# Patient Record
Sex: FEMALE | Race: BLACK OR AFRICAN AMERICAN | Employment: OTHER | URBAN - METROPOLITAN AREA
[De-identification: names, ages, dates, MRNs, and addresses within clinical notes are randomized per-mention and may not be internally consistent; named-entity substitution may affect disease eponyms.]

---

## 2017-05-16 ENCOUNTER — OFFICE VISIT (OUTPATIENT)
Dept: FAMILY MEDICINE CLINIC | Age: 82
End: 2017-05-16

## 2017-05-16 ENCOUNTER — HOSPITAL ENCOUNTER (OUTPATIENT)
Dept: LAB | Age: 82
Discharge: HOME | End: 2017-05-16
Payer: MEDICARE

## 2017-05-16 VITALS
HEART RATE: 70 BPM | TEMPERATURE: 95.3 F | RESPIRATION RATE: 16 BRPM | HEIGHT: 65 IN | BODY MASS INDEX: 31.79 KG/M2 | DIASTOLIC BLOOD PRESSURE: 82 MMHG | OXYGEN SATURATION: 95 % | SYSTOLIC BLOOD PRESSURE: 130 MMHG | WEIGHT: 190.8 LBS

## 2017-05-16 DIAGNOSIS — Z00.00 MEDICARE ANNUAL WELLNESS VISIT, SUBSEQUENT: Primary | ICD-10-CM

## 2017-05-16 DIAGNOSIS — Z23 ENCOUNTER FOR IMMUNIZATION: ICD-10-CM

## 2017-05-16 DIAGNOSIS — E55.9 HYPOVITAMINOSIS D: ICD-10-CM

## 2017-05-16 DIAGNOSIS — E78.2 MIXED HYPERLIPIDEMIA: ICD-10-CM

## 2017-05-16 DIAGNOSIS — I10 ESSENTIAL HYPERTENSION: ICD-10-CM

## 2017-05-16 DIAGNOSIS — E11.9 TYPE 2 DIABETES MELLITUS WITHOUT COMPLICATION, WITHOUT LONG-TERM CURRENT USE OF INSULIN (HCC): ICD-10-CM

## 2017-05-16 DIAGNOSIS — I25.10 ATHEROSCLEROSIS OF NATIVE CORONARY ARTERY OF NATIVE HEART WITHOUT ANGINA PECTORIS: ICD-10-CM

## 2017-05-16 DIAGNOSIS — Z51.81 ENCOUNTER FOR MEDICATION MONITORING: ICD-10-CM

## 2017-05-16 LAB
BILIRUB UR QL STRIP: NEGATIVE
GLUCOSE POC: 120 MG/DL
GLUCOSE UR-MCNC: NEGATIVE MG/DL
HBA1C MFR BLD HPLC: 6.9 %
KETONES P FAST UR STRIP-MCNC: NEGATIVE MG/DL
PH UR STRIP: 7 [PH] (ref 4.6–8)
PROT UR QL STRIP: NEGATIVE MG/DL
SP GR UR STRIP: 1.01 (ref 1–1.03)
UA UROBILINOGEN AMB POC: NORMAL (ref 0.2–1)
URINALYSIS CLARITY POC: CLEAR
URINALYSIS COLOR POC: YELLOW
URINE BLOOD POC: NEGATIVE
URINE LEUKOCYTES POC: NEGATIVE
URINE NITRITES POC: NEGATIVE

## 2017-05-16 PROCEDURE — 36415 COLL VENOUS BLD VENIPUNCTURE: CPT

## 2017-05-16 PROCEDURE — 80048 BASIC METABOLIC PNL TOTAL CA: CPT

## 2017-05-16 PROCEDURE — 82306 VITAMIN D 25 HYDROXY: CPT

## 2017-05-16 PROCEDURE — 80061 LIPID PANEL: CPT

## 2017-05-16 RX ORDER — AMOXICILLIN 500 MG/1
CAPSULE ORAL
Refills: 0 | COMMUNITY
Start: 2017-04-27 | End: 2017-05-16 | Stop reason: ALTCHOICE

## 2017-05-16 NOTE — PROGRESS NOTES
Chief Complaint   Patient presents with   [facility] Annual Wellness Visit     Medicare     CMP 11/8/2016    A1C 11/8/2016    Lipid 11/8/2016    Microalbumin 11/8/2016    Mammogram 3/18/2015    Pt states she had an eye exam in with Dr. Nilsa Orona about 3 weeks ago. Verbal order received per Dr. Nani Gaines 23 IM for need for immunization-VORB. Pt received Pneumococcal 23 IM in left deltoid without any difficulty.

## 2017-05-16 NOTE — MR AVS SNAPSHOT
Visit Information Date & Time Provider Department Dept. Phone Encounter #  
 5/16/2017  8:15 AM Payton Lopez MD [facility] [phone] [id number] Follow-up Instructions Return in about 5 months (around 10/16/2017). Upcoming Health Maintenance Date Due  
 EYE EXAM RETINAL OR DILATED Q1 10/14/2016 HEMOGLOBIN A1C Q6M 5/8/2017 INFLUENZA AGE 9 TO ADULT 8/1/2017 GLAUCOMA SCREENING Q2Y 10/14/2017 MICROALBUMIN Q1 11/8/2017 LIPID PANEL Q1 11/8/2017 FOOT EXAM Q1 5/16/2018 MEDICARE YEARLY EXAM 5/17/2018 DTaP/Tdap/Td series (2 - Td) 5/16/2027 Allergies as of 5/16/2017  Review Complete On: 5/16/2017 By: Payton Lopez MD  
  
 Severity Noted Reaction Type Reactions Metformin Medium 04/09/2013   Systemic Hives, Swelling Pt taking Metformin- pt states doesn't think she is allergic Atenolol  02/23/2010    Diarrhea Lisinopril  02/23/2010    Swelling Lip swelling Lozol [Indapamide]  02/23/2010    Unknown (comments) Skelaxin [Metaxalone]  02/23/2010    Unknown (comments) Pt does not recall Current Immunizations  Reviewed on 5/16/2017 Name Date Pneumococcal Conjugate (PCV-13) 2/3/2015 Td, Adsorbed PF 2/9/2013  1:52 AM  
  
 Reviewed by Payton Lopez MD on 5/16/2017 at  8:58 AM  
You Were Diagnosed With   
  
 Codes Comments Type 2 diabetes mellitus without complication, without long-term current use of insulin (HCC)    -  Primary ICD-10-CM: E11.9 ICD-9-CM: 250.00 Essential hypertension     ICD-10-CM: I10 
ICD-9-CM: 401.9 Mixed hyperlipidemia     ICD-10-CM: E78.2 ICD-9-CM: 272.2 Atherosclerosis of native coronary artery of native heart without angina pectoris     ICD-10-CM: I25.10 ICD-9-CM: 414.01 Hypovitaminosis D     ICD-10-CM: E55.9 ICD-9-CM: 268.9  Encounter for medication monitoring     ICD-10-CM: Z51.81 
ICD-9-CM: V58.83   
 Medicare annual wellness visit, subsequent     ICD-10-CM: Z00.00 ICD-9-CM: V70.0 Vitals BP Pulse Temp Resp Height(growth percentile) Weight(growth percentile) 130/82 70 95.3 °F (35.2 °C) (Oral) 16 5' 4.5\" (1.638 m) 190 lb 12.8 oz (86.5 kg) SpO2 BMI OB Status Smoking Status 95% 32.24 kg/m2 Postmenopausal Former Smoker Vitals History BMI and BSA Data Body Mass Index Body Surface Area  
 32.24 kg/m 2 1.98 m 2 Preferred Pharmacy Pharmacy Name Phone [facility] [address]  [place] [phone] Your Updated Medication List  
  
   
This list is accurate as of: 5/16/17  9:19 AM.  Always use your most recent med list.  
  
  
  
  
 ALEVE 220 mg Cap Generic drug:  naproxen sodium Take 2 Caps by mouth as needed. allopurinol 300 mg tablet Commonly known as:  ZYLOPRIM  
TAKE 1 TABLET BY MOUTH ONCE DAILY  
  
 aspirin delayed-release 81 mg tablet Take 1 tablet by mouth daily. atorvastatin 40 mg tablet Commonly known as:  LIPITOR  
TAKE 1 TABLET BY MOUTH DAILY  
  
 clopidogrel 75 mg Tab Commonly known as:  PLAVIX TAKE 1 TABLET BY MOUTH EVERY DAY  
  
 glucose blood VI test strips strip Commonly known as:  ASCENSIA AUTODISC VI, ONE TOUCH ULTRA TEST VI  
As directed  
  
 losartan-hydroCHLOROthiazide 100-12.5 mg per tablet Commonly known as:  HYZAAR  
TAKE 1 TABLET BY MOUTH DAILY. REPLACES STANDALONE LOSARTAN ON 1/28/16  
  
 metFORMIN  mg tablet Commonly known as:  GLUCOPHAGE XR  
TAKE 1 TABLET BY MOUTH DAILY WITH DINNER  
  
 metoprolol tartrate 50 mg tablet Commonly known as:  LOPRESSOR  
TAKE 2 TABLETS BY MOUTH TWICE DAILY We Performed the Following AMB POC GLUCOSE, QUANTITATIVE, BLOOD [86826 CPT(R)] AMB POC HEMOGLOBIN A1C [31203 CPT(R)] AMB POC URINALYSIS DIP STICK AUTO W/ MICRO [70151 CPT(R)] LIPID PANEL [80930 CPT(R)] METABOLIC PANEL, BASIC [25389 CPT(R)] VITAMIN D, 25 HYDROXY [id number] CPT(R)] Follow-up Instructions Return in about 5 months (around 10/16/2017). Introducing [facility] & [facility] SERVICES! [employer] introduces [employer] patient portal. Now you can access parts of your medical record, email your doctor's office, and request medication refills online. 1. In your internet browser, go to [email or web address] 2. Click on the First Time User? Click Here link in the Sign In box. You will see the New Member Sign Up page. 3. Enter your [employer] Access Code exactly as it appears below. You will not need to use this code after youve completed the sign-up process. If you do not sign up before the expiration date, you must request a new code. · [employer] Access Code: [email or web address] Expires: 8/14/2017  9:19 AM 
 
4. Enter the last four digits of your Social Security Number (xxxx) and Date of Birth (mm/dd/yyyy) as indicated and click Submit. You will be taken to the next sign-up page. 5. Create a [employer] ID. This will be your [employer] login ID and cannot be changed, so think of one that is secure and easy to remember. 6. Create a [employer] password. You can change your password at any time. 7. Enter your Password Reset Question and Answer. This can be used at a later time if you forget your password. 8. Enter your e-mail address. You will receive e-mail notification when new information is available in [address] 9. Click Sign Up. You can now view and download portions of your medical record. 10. Click the Download Summary menu link to download a portable copy of your medical information. If you have questions, please visit the Frequently Asked Questions section of the [employer] website. Remember, [employer] is NOT to be used for urgent needs. For medical emergencies, dial 911. Now available from your iPhone and Android! Please provide this summary of care documentation to your next provider. Your primary care clinician is listed as Erick Perez. If you have any questions after today's visit, please call [phone].

## 2017-05-16 NOTE — PROGRESS NOTES
This is a Subsequent Medicare Annual Wellness Visit providing Personalized Prevention Plan Services (PPPS) (Performed 12 months after initial AWV and PPPS )    I have reviewed the patient's medical history in detail and updated the computerized patient record. Cardiovascular Review:  The patient has hypertension, hyperlipidemia and coronary artery disease. Diet and Lifestyle: generally follows a low fat low cholesterol diet, generally follows a low sodium diet, follows a diabetic diet regularly, exercises sporadically  Home BP Monitorins/70s. Pertinent ROS: taking medications as instructed, no medication side effects noted, no TIA's, no chest pain on exertion, no dyspnea on exertion, no swelling of ankles. DM type II follow up:  Compliant w/ meds, diabetic diet, and exercise. Obtains home glucose monitoring averaging 100-140. Checks BS sproradically. Pt does not have BS log at visit today. No Rf needed for today. Denies any tingling sensation, polyuria and polydipsia. No blurred vision. No significant weight changes. Feeling well since last OV. History     Past Medical History:   Diagnosis Date    CAD (coronary artery disease) 2010    Cataract     Chest pain, unspecified     Chronic low back pain 2010    DM (diabetes mellitus) ([facility] [address] 2010    Gout 2010    HTN (hypertension) 2010    Hyperlipidemia 2010    Other acute and subacute form of ischemic heart disease     Palpitations     Postsurgical percutaneous transluminal coronary angioplasty status 2012    Tachycardia, unspecified       Past Surgical History:   Procedure Laterality Date    HX CATARACT REMOVAL  2010    HX PTCA      GA COLONOSCOPY FLX DX W/COLLJ SPEC WHEN PFRMD  2005    repeat in 10 yrs Dr. Edson Torres     Current Outpatient Prescriptions   Medication Sig Dispense Refill    losartan-hydroCHLOROthiazide (HYZAAR) 100-12.5 mg per tablet TAKE 1 TABLET BY MOUTH DAILY.  REPLACES STANDALONE LOSARTAN ON 1/28/16 90 Tab 1    clopidogrel (PLAVIX) 75 mg tab TAKE 1 TABLET BY MOUTH EVERY DAY 90 Tab 3    metoprolol tartrate (LOPRESSOR) 50 mg tablet TAKE 2 TABLETS BY MOUTH TWICE DAILY 120 Tab 11    atorvastatin (LIPITOR) 40 mg tablet TAKE 1 TABLET BY MOUTH DAILY 30 Tab 6    allopurinol (ZYLOPRIM) 300 mg tablet TAKE 1 TABLET BY MOUTH ONCE DAILY 90 Tab 3    metFORMIN ER (GLUCOPHAGE XR) 500 mg tablet TAKE 1 TABLET BY MOUTH DAILY WITH DINNER 30 Tab 11    aspirin delayed-release 81 mg tablet Take 1 tablet by mouth daily. 30 tablet 11    naproxen sodium (ALEVE) 220 mg cap Take 2 Caps by mouth as needed.       glucose blood VI test strips (ASCENSIA AUTODISC VI, ONE TOUCH ULTRA TEST VI) strip As directed 1 Package 11     Allergies   Allergen Reactions    Metformin Hives and Swelling     Pt taking Metformin- pt states doesn't think she is allergic    Atenolol Diarrhea    Lisinopril Swelling     Lip swelling    Lozol [Indapamide] Unknown (comments)    Skelaxin [Metaxalone] Unknown (comments)     Pt does not recall     Family History   Problem Relation Age of Onset    Cancer Mother      breast    No Known Problems Sister     No Known Problems Brother     Gout Brother     Gout Brother     No Known Problems Brother     No Known Problems Brother     No Known Problems Brother     No Known Problems Sister      Social History   Substance Use Topics    Smoking status: Former Smoker     Quit date: 1/1/1984    Smokeless tobacco: Never Used    Alcohol use 0.0 oz/week     0 Standard drinks or equivalent per week      Comment: occ     Patient Active Problem List   Diagnosis Code    Chronic low back pain M54.5, G89.29    CAD (coronary artery disease) I25.10    Gout M10.9    LFT'S ABNORMAL     Hypovitaminosis D E55.9    Mitral valve disorders I05.9    Coronary atherosclerosis of native coronary artery I25.10    Obesity, unspecified E66.9    Premature beats, unspecified I49.49    Mixed hyperlipidemia E78.2    Postsurgical percutaneous transluminal coronary angioplasty status Z98.61    Paroxysmal SVT (supraventricular tachycardia) (HCC) I47.1    SVT (supraventricular tachycardia) (HCC) I47.1    Essential hypertension I10    Type 2 diabetes mellitus without complication ([facility] [address] D25.7    Encounter for medication monitoring Z51.81       Depression Risk Factor Screening:     PHQ over the last two weeks 11/8/2016   Little interest or pleasure in doing things Not at all   Feeling down, depressed or hopeless Not at all   Total Score PHQ 2 0     Functional Ability:   Does the patient exhibit a steady gait? yes    How long did it take the patient to get up and walk from a sitting position? seconds    Is the patient self reliant? (ie can do own laundry, meals, household chores)  yes   Does the patient handle his/her own medications? yes   Does the patient handle his/her own money? yes   Is the patients home safe (ie good lighting, handrails on stairs and bath, etc.)? yes   Did you notice or did patient express any hearing difficulties? no   Did you notice or did patient express any vision difficulties?  no   Were distance and reading eye charts used? yes     Advance Care Planning:   Patient was offered the opportunity to discuss advance care planning:  yes    Does patient have an Advance Directive:  Yes, will provide copy for the chart   If no, did you provide information on Caring Connections? Alcohol Risk Factor Screening: On any occasion during the past 3 months, have you had more than 3 drinks containing alcohol? No    Do you average more than 7 drinks per week? No      Functional Ability and Level of Safety:     Hearing Loss   none    Activities of Daily Living   Self-care. Requires assistance with: no ADLs    Fall Risk     Fall Risk Assessment, last 12 mths 5/16/2017   Able to walk? Yes   Fall in past 12 months?  No     Abuse Screen   Patient is not abused    Review of Systems Constitutional: negative for fatigue and malaise  Eyes: negative for irritation and redness  Ears, nose, mouth, throat, and face: negative for earaches, nasal congestion and hoarseness  Respiratory: negative for cough, sputum or dyspnea on exertion  Cardiovascular: negative for chest pain, chest pressure/discomfort, dyspnea, palpitations, irregular heart beats, fatigue, lower extremity edema  Gastrointestinal: negative for dysphagia, dyspepsia, reflux symptoms, nausea, vomiting, change in bowel habits, melena, constipation and abdominal pain  Genitourinary:negative for frequency, dysuria, nocturia, urinary incontinence   Integument/breast: negative for rash and dryness  Hematologic/lymphatic: negative for easy bruising and lymphadenopathy  Musculoskeletal:negative for myalgias, arthralgias, stiff joints, neck pain, back pain and muscle weakness  Neurological: negative for headaches, dizziness, tremor and weakness  Endocrine: negative for diabetic symptoms including polyuria and polydipsia        Physical Examination     Evaluation of Cognitive Function:  Mood/affect:  neutral  Appearance: age appropriate  Family member/caregiver input: NA    Visit Vitals    /82    Pulse 70    Temp 95.3 °F (35.2 °C) (Oral)    Resp 16    Ht 5' 4.5\" (1.638 m)    Wt 190 lb 12.8 oz (86.5 kg)    SpO2 95%    BMI 32.24 kg/m2     General:  Alert, cooperative, no distress, appears stated age. Head:  Normocephalic, without obvious abnormality, atraumatic. Ears:  Normal TMs and external ear canals both ears. Nose: Nares normal. Septum midline. Mucosa normal. No drainage or sinus tenderness. Throat: Lips, mucosa, and tongue normal. Teeth and gums normal.   Neck: Supple, symmetrical, trachea midline, no adenopathy, thyroid: no enlargement/tenderness/nodules, no carotid bruit and no JVD. Back:   Symmetric, no curvature. ROM normal. No CVA tenderness. Lungs:   Clear to auscultation bilaterally.    Chest wall:  No tenderness or deformity. Heart:  Regular rate and rhythm, S1, S2 normal, no murmur, click, rub or gallop. Breast Exam:  No tenderness, masses, or nipple abnormality. Abdomen:   Soft, non-tender. Bowel sounds normal. No masses,  No organomegaly. Rectal:  Normal tone,  no masses or tenderness  Guaiac negative stool. Extremities: Extremities normal, atraumatic, no cyanosis or edema. Pulses: 2+ and symmetric all extremities. Skin: Skin color, texture, turgor normal. No rashes or lesions. Lymph nodes: Cervical, supraclavicular, and axillary nodes normal.   Neurologic: CNII-XII intact. Normal strength, sensation and reflexes throughout. Patient Care Team:  Igor Sevilla MD as PCP - General  Jaleel Wilson RN as Nurse Navigator  Slava Bourgeois MD (Cardiology)    Advice/Referrals/Counseling   Education and counseling provided:  Are appropriate based on today's review and evaluation  End-of-Life planning (with patient's consent)      Assessment/Plan   Jaz Heard was seen today for annual wellness visit. Diagnoses and all orders for this visit:    Medicare annual wellness visit, subsequent  -     AMB POC URINALYSIS DIP STICK AUTO W/ MICRO  -     NM COLLECTION VENOUS BLOOD,VENIPUNCTURE    Essential hypertension  Discussed sodium restriction, high k rich diet, maintaining ideal body weight and regular exercise program such as daily walking 30 min perday 4-5 times per week, as physiologic means to achieve blood pressure control.  Medication compliance advised.      Type 2 diabetes mellitus without complication, without long-term current use of insulin (HCC)  -     AMB POC HEMOGLOBIN A1C  -     AMB POC GLUCOSE, QUANTITATIVE, BLOOD    Mixed hyperlipidemia  -     LIPID PANEL  -     NM COLLECTION VENOUS BLOOD,VENIPUNCTURE    Atherosclerosis of native coronary artery of native heart without angina pectoris  Stable     Hypovitaminosis D  -     VITAMIN D, 25 HYDROXY  -     NM COLLECTION VENOUS BLOOD,VENIPUNCTURE    Encounter for medication monitoring  -     METABOLIC PANEL, BASIC  -     HI COLLECTION VENOUS BLOOD,VENIPUNCTURE      Follow-up Disposition:  Return in about 5 months (around 10/16/2017). reviewed diet, exercise and weight control  cardiovascular risk and specific lipid/LDL goals reviewed  reviewed medications and side effects in detail  specific diabetic recommendations: low cholesterol diet, weight control and daily exercise discussed, home glucose monitoring emphasized, all medications, side effects and compliance discussed carefully, foot care discussed and Podiatry visits discussed, annual eye examinations at Ophthalmology discussed and glycohemoglobin and other lab monitoring discussed  use of aspirin to prevent MI and TIA's discussed. I have discussed diagnosis listed in this note with pt and/or family. I have discussed treatment plans and options and the risk/benefit analysis of those options, including safe use of medications and possible medication side effects. Through the use of shared decision making we have agreed to the above plan. The patient has received an after-visit summary and questions were answered concerning future plans and follow up. Advise pt of any urgent changes then to proceed to the ER.

## 2017-05-17 LAB
25(OH)D3+25(OH)D2 SERPL-MCNC: 20.1 NG/ML (ref 30–100)
BUN SERPL-MCNC: 12 MG/DL (ref 8–27)
BUN/CREAT SERPL: 13 (ref 12–28)
CALCIUM SERPL-MCNC: 9.4 MG/DL (ref 8.7–10.3)
CHLORIDE SERPL-SCNC: 99 MMOL/L (ref 96–106)
CHOLEST SERPL-MCNC: 139 MG/DL (ref 100–199)
CO2 SERPL-SCNC: 24 MMOL/L (ref 18–29)
CREAT SERPL-MCNC: 0.91 MG/DL (ref 0.57–1)
GLUCOSE SERPL-MCNC: 102 MG/DL (ref 65–99)
HDLC SERPL-MCNC: 53 MG/DL
INTERPRETATION, 910389: NORMAL
INTERPRETATION: NORMAL
LDLC SERPL CALC-MCNC: 68 MG/DL (ref 0–99)
PDF IMAGE, 910387: NORMAL
POTASSIUM SERPL-SCNC: 4.6 MMOL/L (ref 3.5–5.2)
SODIUM SERPL-SCNC: 138 MMOL/L (ref 134–144)
TRIGL SERPL-MCNC: 89 MG/DL (ref 0–149)
VLDLC SERPL CALC-MCNC: 18 MG/DL (ref 5–40)

## 2017-05-18 RX ORDER — ERGOCALCIFEROL 1.25 MG/1
50000 CAPSULE ORAL
Qty: 8 CAP | Refills: 0 | Status: SHIPPED | OUTPATIENT
Start: 2017-05-18 | End: 2017-07-07

## 2017-08-28 ENCOUNTER — OFFICE VISIT (OUTPATIENT)
Dept: CARDIOLOGY CLINIC | Age: 82
End: 2017-08-28

## 2017-08-28 VITALS
HEIGHT: 65 IN | HEART RATE: 60 BPM | SYSTOLIC BLOOD PRESSURE: 110 MMHG | WEIGHT: 186.1 LBS | RESPIRATION RATE: 16 BRPM | BODY MASS INDEX: 31 KG/M2 | OXYGEN SATURATION: 97 % | DIASTOLIC BLOOD PRESSURE: 70 MMHG

## 2017-08-28 DIAGNOSIS — E11.9 TYPE 2 DIABETES MELLITUS WITHOUT COMPLICATION, WITHOUT LONG-TERM CURRENT USE OF INSULIN (HCC): ICD-10-CM

## 2017-08-28 DIAGNOSIS — I10 ESSENTIAL HYPERTENSION: ICD-10-CM

## 2017-08-28 DIAGNOSIS — E78.2 MIXED HYPERLIPIDEMIA: ICD-10-CM

## 2017-08-28 DIAGNOSIS — I34.0 MITRAL VALVE INSUFFICIENCY, UNSPECIFIED ETIOLOGY: ICD-10-CM

## 2017-08-28 DIAGNOSIS — I47.1 PAROXYSMAL SVT (SUPRAVENTRICULAR TACHYCARDIA) (HCC): ICD-10-CM

## 2017-08-28 DIAGNOSIS — I25.10 CORONARY ARTERY DISEASE INVOLVING NATIVE CORONARY ARTERY OF NATIVE HEART WITHOUT ANGINA PECTORIS: Primary | ICD-10-CM

## 2017-08-28 DIAGNOSIS — Z98.61 POSTSURGICAL PERCUTANEOUS TRANSLUMINAL CORONARY ANGIOPLASTY STATUS: ICD-10-CM

## 2017-08-28 RX ORDER — ATORVASTATIN CALCIUM 40 MG/1
TABLET, FILM COATED ORAL
Qty: 30 TAB | Refills: 11 | Status: SHIPPED | OUTPATIENT
Start: 2017-08-28 | End: 2017-08-28 | Stop reason: SDUPTHER

## 2017-08-28 RX ORDER — CHOLECALCIFEROL (VITAMIN D3) 125 MCG
CAPSULE ORAL DAILY
COMMUNITY

## 2017-08-28 RX ORDER — ATORVASTATIN CALCIUM 40 MG/1
TABLET, FILM COATED ORAL
Qty: 90 TAB | Refills: 11 | Status: SHIPPED | OUTPATIENT
Start: 2017-08-28

## 2017-08-28 NOTE — MR AVS SNAPSHOT
Visit Information Date & Time Provider Department Dept. Phone Encounter #  
 8/28/2017  1:15 PM Doreen Davis, [address] [facility] [phone] Your Appointments 10/17/2017  8:00 AM  
ROUTINE CARE with Susana Garcia MD  
[facility] [address] [facility]) Appt Note: 5m f/u  
 [address] [facility] [place] [id number] [phone] [address] [facility] [address] Upcoming Health Maintenance Date Due  
 EYE EXAM RETINAL OR DILATED Q1 10/14/2016 INFLUENZA AGE 9 TO ADULT 8/1/2017 MICROALBUMIN Q1 11/8/2017 HEMOGLOBIN A1C Q6M 11/16/2017 FOOT EXAM Q1 5/16/2018 LIPID PANEL Q1 5/16/2018 MEDICARE YEARLY EXAM 5/17/2018 GLAUCOMA SCREENING Q2Y 4/25/2019 DTaP/Tdap/Td series (2 - Td) 5/16/2027 Allergies as of 8/28/2017  Review Complete On: 8/28/2017 By: Keara Ma NP Severity Noted Reaction Type Reactions Metformin Medium 04/09/2013   Systemic Hives, Swelling Pt taking Metformin- pt states doesn't think she is allergic Atenolol  02/23/2010    Diarrhea Lisinopril  02/23/2010    Swelling Lip swelling Lozol [Indapamide]  02/23/2010    Unknown (comments) Skelaxin [Metaxalone]  02/23/2010    Unknown (comments) Pt does not recall Current Immunizations  Reviewed on 5/16/2017 Name Date Pneumococcal Conjugate (PCV-13) 2/3/2015 Pneumococcal Polysaccharide (PPSV-23) 5/16/2017 Td, Adsorbed PF 2/9/2013  1:52 AM  
  
 Not reviewed this visit You Were Diagnosed With   
  
 Codes Comments Coronary artery disease involving native coronary artery of native heart without angina pectoris    -  Primary ICD-10-CM: I25.10 ICD-9-CM: 414.01 Mixed hyperlipidemia     ICD-10-CM: E78.2 ICD-9-CM: 272.2 Paroxysmal SVT (supraventricular tachycardia) (HCC)     ICD-10-CM: I47.1 ICD-9-CM: 427.0  Essential hypertension     ICD-10-CM: I10 
 ICD-9-CM: 401.9 Postsurgical percutaneous transluminal coronary angioplasty status     ICD-10-CM: Z98.61 ICD-9-CM: V45.82 Type 2 diabetes mellitus without complication, without long-term current use of insulin (HCC)     ICD-10-CM: E11.9 ICD-9-CM: 250.00 Mitral valve insufficiency, unspecified etiology     ICD-10-CM: I34.0 ICD-9-CM: 424.0 Vitals BP Pulse Resp Height(growth percentile) Weight(growth percentile) SpO2  
 110/70 (BP Patient Position: Sitting) 60 16 5' 4.5\" (1.638 m) 186 lb 1.6 oz (84.4 kg) 97% BMI OB Status Smoking Status 31.45 kg/m2 Postmenopausal Former Smoker BMI and BSA Data Body Mass Index Body Surface Area  
 31.45 kg/m 2 1.96 m 2 Preferred Pharmacy Pharmacy Name Phone [facility] [employer] [address] [place] [address]  [place] [phone] Your Updated Medication List  
  
   
This list is accurate as of: 8/28/17  1:29 PM.  Always use your most recent med list.  
  
  
  
  
 ALEVE 220 mg Cap Generic drug:  naproxen sodium Take 2 Caps by mouth as needed. allopurinol 300 mg tablet Commonly known as:  ZYLOPRIM  
TAKE 1 TABLET BY MOUTH ONCE DAILY  
  
 aspirin delayed-release 81 mg tablet Take 1 tablet by mouth daily. atorvastatin 40 mg tablet Commonly known as:  LIPITOR  
TAKE 1 TABLET BY MOUTH DAILY  
  
 cholecalciferol (vitamin D3) 2,000 unit Tab Take  by mouth. clopidogrel 75 mg Tab Commonly known as:  PLAVIX TAKE 1 TABLET BY MOUTH EVERY DAY  
  
 glucose blood VI test strips strip Commonly known as:  ASCENSIA AUTODISC VI, ONE TOUCH ULTRA TEST VI  
As directed  
  
 losartan-hydroCHLOROthiazide 100-12.5 mg per tablet Commonly known as:  HYZAAR  
TAKE 1 TABLET BY MOUTH DAILY. REPLACES STANDALONE LOSARTAN ON 1/28/16  
  
 metFORMIN  mg tablet Commonly known as:  GLUCOPHAGE XR  
TAKE 1 TABLET BY MOUTH DAILY WITH DINNER  
  
 metoprolol tartrate 50 mg tablet Commonly known as:  LOPRESSOR  
TAKE 2 TABLETS BY MOUTH TWICE DAILY Prescriptions Sent to Pharmacy Refills  
 atorvastatin (LIPITOR) 40 mg tablet (Discontinued) 11 Sig: TAKE 1 TABLET BY MOUTH DAILY Class: Normal  
 Pharmacy: [facility] [address] [place] [address] #: [phone] Reason for Discontinue: Reorder We Performed the Following AMB POC EKG ROUTINE W/ 12 LEADS, INTER & REP [59134 CPT(R)] Introducing [facility] SERVICES! [employer] introduces [employer] patient portal. Now you can access parts of your medical record, email your doctor's office, and request medication refills online. 1. In your internet browser, go to [email or web address] 2. Click on the First Time User? Click Here link in the Sign In box. You will see the New Member Sign Up page. 3. Enter your [employer] Access Code exactly as it appears below. You will not need to use this code after youve completed the sign-up process. If you do not sign up before the expiration date, you must request a new code. · [employer] Access Code: [email or web address] Expires: 11/26/2017  1:27 PM 
 
4. Enter the last four digits of your Social Security Number (xxxx) and Date of Birth (mm/dd/yyyy) as indicated and click Submit. You will be taken to the next sign-up page. 5. Create a [employer] ID. This will be your [employer] login ID and cannot be changed, so think of one that is secure and easy to remember. 6. Create a [employer] password. You can change your password at any time. 7. Enter your Password Reset Question and Answer. This can be used at a later time if you forget your password. 8. Enter your e-mail address. You will receive e-mail notification when new information is available in [address] 9. Click Sign Up. You can now view and download portions of your medical record. 10. Click the Download Summary menu link to download a portable copy of your medical information. If you have questions, please visit the Frequently Asked Questions section of the [employer] website. Remember, [employer] is NOT to be used for urgent needs. For medical emergencies, dial 911. Now available from your iPhone and Android! Please provide this summary of care documentation to your next provider. Your primary care clinician is listed as Maribell Wynne. If you have any questions after today's visit, please call [phone].

## 2017-08-28 NOTE — PROGRESS NOTES
Subjective/HPI:     Daren Figueroa is a 80 y.o. female is here for f/u appt. The patient denies chest pain/ shortness of breath, orthopnea, PND, LE edema, palpitations, syncope, presyncope or fatigue. Doing well. PCP Provider  Lucy Weller MD  Past Medical History:   Diagnosis Date    CAD (coronary artery disease) 2/23/2010    Cataract     Chest pain, unspecified     Chronic low back pain 2/23/2010    DM (diabetes mellitus) ([address] 2/23/2010    Gout 2/23/2010    HTN (hypertension) 2/23/2010    Hyperlipidemia 2/23/2010    Other acute and subacute form of ischemic heart disease     Palpitations     Postsurgical percutaneous transluminal coronary angioplasty status 4/25/2012    Tachycardia, unspecified       Past Surgical History:   Procedure Laterality Date    HX CATARACT REMOVAL  july 2010    HX PTCA      ID COLONOSCOPY FLX DX W/COLLJ SPEC WHEN PFRMD  09/2005    repeat in 10 yrs Dr. Alida Carrera   Allergen Reactions    Metformin Hives and Swelling     Pt taking Metformin- pt states doesn't think she is allergic    Atenolol Diarrhea    Lisinopril Swelling     Lip swelling    Lozol [Indapamide] Unknown (comments)    Skelaxin [Metaxalone] Unknown (comments)     Pt does not recall      Family History   Problem Relation Age of Onset    Cancer Mother      breast    No Known Problems Sister     No Known Problems Brother     Gout Brother     Gout Brother     No Known Problems Brother     No Known Problems Brother     No Known Problems Brother     No Known Problems Sister       Current Outpatient Prescriptions   Medication Sig    cholecalciferol, vitamin D3, 2,000 unit tab Take  by mouth.  losartan-hydroCHLOROthiazide (HYZAAR) 100-12.5 mg per tablet TAKE 1 TABLET BY MOUTH DAILY.  REPLACES STANDALONE LOSARTAN ON 1/28/16    clopidogrel (PLAVIX) 75 mg tab TAKE 1 TABLET BY MOUTH EVERY DAY    metoprolol tartrate (LOPRESSOR) 50 mg tablet TAKE 2 TABLETS BY MOUTH TWICE DAILY    allopurinol (ZYLOPRIM) 300 mg tablet TAKE 1 TABLET BY MOUTH ONCE DAILY    metFORMIN ER (GLUCOPHAGE XR) 500 mg tablet TAKE 1 TABLET BY MOUTH DAILY WITH DINNER    aspirin delayed-release 81 mg tablet Take 1 tablet by mouth daily.  naproxen sodium (ALEVE) 220 mg cap Take 2 Caps by mouth as needed.  atorvastatin (LIPITOR) 40 mg tablet TAKE 1 TABLET BY MOUTH DAILY    glucose blood VI test strips (ASCENSIA AUTODISC VI, ONE TOUCH ULTRA TEST VI) strip As directed     No current facility-administered medications for this visit. Vitals:    08/28/17 1307   BP: 110/70   Pulse: 60   Resp: 16   SpO2: 97%   Weight: 186 lb 1.6 oz (84.4 kg)   Height: 5' 4.5\" (1.638 m)     Social History     Social History    Marital status:      Spouse name: N/A    Number of children: N/A    Years of education: N/A     Occupational History    Not on file. Social History Main Topics    Smoking status: Former Smoker     Quit date: 1/1/1984    Smokeless tobacco: Never Used    Alcohol use 0.0 oz/week     0 Standard drinks or equivalent per week      Comment: occ    Drug use: No    Sexual activity: Not Currently     Other Topics Concern    Not on file     Social History Narrative       I have reviewed the nurses notes, vitals, problem list, allergy list, medical history, family, social history and medications. Review of Symptoms:    General: Pt denies excessive weight gain or loss. Pt is able to conduct ADL's  HEENT: Denies blurred vision, headaches, epistaxis and difficulty swallowing. Respiratory: Denies shortness of breath, ALVES, wheezing or stridor.   Cardiovascular: Denies precordial pain, palpitations, edema or PND  Gastrointestinal: Denies poor appetite, indigestion, abdominal pain or blood in stool  Urinary: Denies dysuria, pyuria  Musculoskeletal: Denies new onset of pain or swelling from muscles or joints  Neurologic: Denies new onset of tremor, paresthesias, or sensory motor disturbance  Skin: Denies rash, itching or texture change. Psych: Denies depression        Physical Exam:      General: Well developed, in no acute distress, cooperative and alert  HEENT: No carotid bruits, no JVD, trach is midline. Neck Supple, PEERL, EOM intact. Heart:  Normal S1/S2 negative S3 or S4. Regular, no murmur, gallop or rub.   Respiratory: Clear bilaterally x 4, no wheezing or rales  Abdomen:   Soft, non-tender, no masses, bowel sounds are active.   Extremities:  No edema, normal cap refill, no cyanosis, atraumatic. Neuro: A&Ox3, speech clear, gait stable. Skin: Skin color is normal. No rashes or lesions. Non diaphoretic  Vascular: 2+ pulses symmetric in all extremities    Cardiographics    ECG: Sinus  Rhythm  -First degree A-V block   Right bundle branch block. Nonspecific T-abnormality. Results for orders placed or performed in visit on 02/22/13   HOLTER MONITOR, 24 HOURS    Narrative    Reason for holter:  Tachycardia  Heart rate:  Slowest:  48  Average:  64  Fastest:  98      Results:   Underlying Rhythm: sinus bradycardia     Atrial Arrhythmias: premature atrial contractions; occasional             AV Conduction: normal    Ventricular Arrhythmias: premature ventricular contractions; rare    ST Segment Analysis:normal     Symptom Correlation:  None reported    Comment:   Sinus bradycardia with pacs. No symptoms reported.     Chuck Casillas MD, Cayetano Sharma      Results for orders placed or performed during the hospital encounter of 02/09/13   EKG, 12 LEAD, INITIAL   Result Value Ref Range    Ventricular Rate 65 BPM    Atrial Rate 65 BPM    P-R Interval 280 ms    QRS Duration 146 ms    Q-T Interval 470 ms    QTC Calculation (Bezet) 488 ms    Calculated P Axis 59 degrees    Calculated R Axis 12 degrees    Calculated T Axis -10 degrees    Diagnosis       Sinus rhythm with 1st degree AV block  Right bundle branch block  Abnormal ECG  No previous ECGs available  Confirmed by Dai Kennedy MD, Awa Ovalle ([id number]) on 2/9/2013 1:38:57 PM         Cardiology Labs:  Lab Results   Component Value Date/Time    Cholesterol, total 139 05/16/2017 09:15 AM    HDL Cholesterol 53 05/16/2017 09:15 AM    LDL, calculated 68 05/16/2017 09:15 AM    Triglyceride 89 05/16/2017 09:15 AM    CHOL/HDL Ratio 2.4 08/27/2010 01:01 PM       Lab Results   Component Value Date/Time    Sodium 138 05/16/2017 09:15 AM    Potassium 4.6 05/16/2017 09:15 AM    Chloride 99 05/16/2017 09:15 AM    CO2 24 05/16/2017 09:15 AM    Anion gap 9 02/09/2013 01:47 AM    Glucose 102 05/16/2017 09:15 AM    BUN 12 05/16/2017 09:15 AM    Creatinine 0.91 05/16/2017 09:15 AM    BUN/Creatinine ratio 13 05/16/2017 09:15 AM    GFR est AA 67 05/16/2017 09:15 AM    GFR est non-AA 58 05/16/2017 09:15 AM    Calcium 9.4 05/16/2017 09:15 AM    AST (SGOT) 17 11/08/2016 08:57 AM    Alk. phosphatase 75 11/08/2016 08:57 AM    Protein, total 7.3 11/08/2016 08:57 AM    Albumin 4.6 11/08/2016 08:57 AM    Globulin 3.1 11/09/2010 10:31 AM    A-G Ratio 1.7 11/08/2016 08:57 AM    ALT (SGPT) 18 11/08/2016 08:57 AM           Assessment:     Assessment:     Diagnoses and all orders for this visit:    1. Coronary artery disease involving native coronary artery of native heart without angina pectoris    2. Mixed hyperlipidemia  -     AMB POC EKG ROUTINE W/ 12 LEADS, INTER & REP    3. Paroxysmal SVT (supraventricular tachycardia) ([address]    4. Essential hypertension    5. Postsurgical percutaneous transluminal coronary angioplasty status    6. Type 2 diabetes mellitus without complication, without long-term current use of insulin (HCC)    7. Mitral valve insufficiency, unspecified etiology        ICD-10-CM ICD-9-CM    1. Coronary artery disease involving native coronary artery of native heart without angina pectoris I25.10 414.01    2.  Mixed hyperlipidemia E78.2 272.2 AMB POC EKG ROUTINE W/ 12 LEADS, INTER & REP      cholecalciferol, vitamin D3, 2,000 unit tab      DISCONTINUED: atorvastatin (LIPITOR) 40 mg tablet   3. Paroxysmal SVT (supraventricular tachycardia) (HCC) I47.1 427.0    4. Essential hypertension I10 401.9    5. Postsurgical percutaneous transluminal coronary angioplasty status Z98.61 V45.82    6. Type 2 diabetes mellitus without complication, without long-term current use of insulin (HCC) E11.9 250.00    7. Mitral valve insufficiency, unspecified etiology I34.0 424.0      Orders Placed This Encounter    AMB POC EKG ROUTINE W/ 12 LEADS, INTER & REP     Order Specific Question:   Reason for Exam:     Answer:   jimena    cholecalciferol, vitamin D3, 2,000 unit tab     Sig: Take  by mouth.  DISCONTD: atorvastatin (LIPITOR) 40 mg tablet     Sig: TAKE 1 TABLET BY MOUTH DAILY     Dispense:  30 Tab     Refill:  11        Plan:     CAD:  Pt is without cardiac complaints today. Cont on ASA and plavix with h/o bifurcation PCI.     Palpitations: Denies palpitations. EKG remains SR. Continue current tx.     HTN: Well controlled    Hyperlipidemia: reviewed labs 5/17. LDL 68, HDL 53, trig 89. Continue statin       f/u in 1 year. Counseled on diet (including DASH diet instructions given) and exercise- eventual goal of 30-60 minutes 5-7 times a week as per AHA guidelines.         Olivier Shi MD

## 2017-10-16 NOTE — PROGRESS NOTES
HISTORY OF PRESENT ILLNESS  Eleuterio Browning is a 80 y.o. female. HPI   Follow up on chronic medical problems. Overall feeling good. Cardiovascular Review:  The patient has hypertension, hyperlipidemia and coronary artery disease. Seen by cardiology on this past august and no changes made. Gave a 1 year follow up. Diet and Lifestyle: generally follows a low fat low cholesterol diet, generally follows a low sodium diet, follows a diabetic diet regularly, exercises sporadically  Home BP Monitorins/70s. Pertinent ROS: taking medications as instructed, no medication side effects noted, no TIA's, no chest pain on exertion, no dyspnea on exertion, no swelling of ankles. Has not been exercising as much. DM type II follow up:  Compliant w/ meds, diabetic diet, and exercise. Has not been checking her BS. No Rf needed for today. Denies any tingling sensation, polyuria and polydipsia. No blurred vision. No significant weight changes. Feeling well since last OV.     Patient Active Problem List   Diagnosis Code    Chronic low back pain M54.5, G89.29    CAD (coronary artery disease) I25.10    Gout M10.9    LFT'S ABNORMAL     Hypovitaminosis D E55.9    Mitral valve disorders(424.0) I05.9    Coronary atherosclerosis of native coronary artery I25.10    Obesity, unspecified E66.9    Premature beats, unspecified I49.49    Mixed hyperlipidemia E78.2    Postsurgical percutaneous transluminal coronary angioplasty status Z98.61    Paroxysmal SVT [facility]supraventricular tachycardia) ([facility]) I47.1    SVT (supraventricular tachycardia) ([facility]) I47.1    Essential hypertension I10    Type 2 diabetes mellitus without complication ([facility]) S24.3    Encounter for medication monitoring Z51.81       Current Outpatient Prescriptions   Medication Sig Dispense Refill    metFORMIN ER (GLUCOPHAGE XR) 500 mg tablet TAKE 1 TABLET BY MOUTH DAILY WITH DINNER 30 Tab 11    cholecalciferol, vitamin D3, 2,000 unit tab Take  by mouth.      atorvastatin (LIPITOR) 40 mg tablet TAKE 1 TABLET BY MOUTH DAILY 90 Tab 11    losartan-hydroCHLOROthiazide (HYZAAR) 100-12.5 mg per tablet TAKE 1 TABLET BY MOUTH DAILY. REPLACES STANDALONE LOSARTAN ON 1/28/16 90 Tab 1    clopidogrel (PLAVIX) 75 mg tab TAKE 1 TABLET BY MOUTH EVERY DAY 90 Tab 3    metoprolol tartrate (LOPRESSOR) 50 mg tablet TAKE 2 TABLETS BY MOUTH TWICE DAILY 120 Tab 11    allopurinol (ZYLOPRIM) 300 mg tablet TAKE 1 TABLET BY MOUTH ONCE DAILY 90 Tab 3    aspirin delayed-release 81 mg tablet Take 1 tablet by mouth daily. 30 tablet 11    naproxen sodium (ALEVE) 220 mg cap Take 2 Caps by mouth as needed.       glucose blood VI test strips (ASCENSIA AUTODISC VI, ONE TOUCH ULTRA TEST VI) strip As directed 1 Package 11       Allergies   Allergen Reactions    Metformin Hives and Swelling     Pt taking Metformin- pt states doesn't think she is allergic    Atenolol Diarrhea    Lisinopril Swelling     Lip swelling    Lozol [Indapamide] Unknown (comments)    Skelaxin [Metaxalone] Unknown (comments)     Pt does not recall         Past Medical History:   Diagnosis Date    CAD (coronary artery disease) 2/23/2010    Cataract     Chest pain, unspecified     Chronic low back pain 2/23/2010    DM (diabetes mellitus) ([address] 2/23/2010    Gout 2/23/2010    HTN (hypertension) 2/23/2010    Hyperlipidemia 2/23/2010    Other acute and subacute form of ischemic heart disease     Palpitations     Postsurgical percutaneous transluminal coronary angioplasty status 4/25/2012    Tachycardia, unspecified          Past Surgical History:   Procedure Laterality Date    HX CATARACT REMOVAL  july 2010    HX PTCA      [place] COLONOSCOPY FLX DX W/COLLJ SPEC WHEN PFRMD  09/2005    repeat in 10 yrs Dr. Vandana Manuel         Family History   Problem Relation Age of Onset    Cancer Mother      breast    No Known Problems Sister     No Known Problems Brother     Gout Brother     Gout Brother     No Known Problems Brother     No Known Problems Brother     No Known Problems Brother     No Known Problems Sister        Social History   Substance Use Topics    Smoking status: Former Smoker     Quit date: 1/1/1984    Smokeless tobacco: Never Used    Alcohol use 0.0 oz/week     0 Standard drinks or equivalent per week      Comment: occ        Lab Results   Component Value Date/Time    WBC 6.2 11/08/2016 08:57 AM    HGB 14.5 11/08/2016 08:57 AM    HCT 42.4 11/08/2016 08:57 AM    PLATELET 892 14/39/7174 08:57 AM    MCV 92 11/08/2016 08:57 AM       Lab Results   Component Value Date/Time    Cholesterol, total 139 05/16/2017 09:15 AM    HDL Cholesterol 53 05/16/2017 09:15 AM    LDL, calculated 68 05/16/2017 09:15 AM    Triglyceride 89 05/16/2017 09:15 AM    CHOL/HDL Ratio 2.4 08/27/2010 01:01 PM       Lab Results   Component Value Date/Time    TSH 3.050 09/20/2013 10:38 AM    T3 Uptake 30 06/20/2012 12:00 AM    T4, Free 0.91 09/20/2013 10:38 AM    T4, Total 4.6 06/20/2012 12:00 AM      Lab Results   Component Value Date/Time    Sodium 138 05/16/2017 09:15 AM    Potassium 4.6 05/16/2017 09:15 AM    Chloride 99 05/16/2017 09:15 AM    CO2 24 05/16/2017 09:15 AM    Anion gap 9 02/09/2013 01:47 AM    Glucose 102 05/16/2017 09:15 AM    BUN 12 05/16/2017 09:15 AM    Creatinine 0.91 05/16/2017 09:15 AM    BUN/Creatinine ratio 13 05/16/2017 09:15 AM    GFR est AA 67 05/16/2017 09:15 AM    GFR est non-AA 58 05/16/2017 09:15 AM    Calcium 9.4 05/16/2017 09:15 AM    Bilirubin, total 0.4 11/08/2016 08:57 AM    ALT (SGPT) 18 11/08/2016 08:57 AM    AST (SGOT) 17 11/08/2016 08:57 AM    Alk.  phosphatase 75 11/08/2016 08:57 AM    Protein, total 7.3 11/08/2016 08:57 AM    Albumin 4.6 11/08/2016 08:57 AM    Globulin 3.1 11/09/2010 10:31 AM    A-G Ratio 1.7 11/08/2016 08:57 AM      Lab Results   Component Value Date/Time    Hemoglobin A1c 6.5 12/17/2013 08:07 AM    Hemoglobin A1c (POC) 6.9 05/16/2017 08:30 AM         Review of Systems Constitutional: Negative for malaise/fatigue. HENT: Negative for congestion. Eyes: Negative for blurred vision. Respiratory: Negative for cough and shortness of breath. Cardiovascular: Negative for chest pain, palpitations and leg swelling. Gastrointestinal: Negative for heartburn, abdominal pain and constipation. Genitourinary: Negative for dysuria, urgency and frequency. Musculoskeletal: Negative for back pain and joint pain. Neurological: Negative for dizziness, tingling and headaches. Endo/Heme/Allergies: Negative for environmental allergies. Psychiatric/Behavioral: Negative for depression. The patient does not have insomnia. Physical Exam   Constitutional: She appears well-developed and well-nourished. HENT:   Right Ear: Tympanic membrane and ear canal normal.   Left Ear: Tympanic membrane and ear canal normal.   Nose: No mucosal edema or rhinorrhea. Mouth/Throat: Oropharynx is clear and moist and mucous membranes are normal.   Neck: Normal range of motion. Neck supple. No thyromegaly present. Cardiovascular: Normal rate and regular rhythm. No murmur heard. Pulmonary/Chest: Effort normal and breath sounds normal.   Abdominal: Soft. Bowel sounds are normal. There is no tenderness. Musculoskeletal: Normal range of motion. She exhibits no edema. Lymphadenopathy:     She has no cervical adenopathy. Skin: Skin is warm and dry. Psychiatric: She has a normal mood and affect. Nursing note and vitals reviewed. ASSESSMENT and PLAN  Diagnoses and all orders for this visit:    1. Essential hypertension  Discussed sodium restriction, high k rich diet, maintaining ideal body weight and regular exercise program such as daily walking 30 min perday 4-5 times per week, as physiologic means to achieve blood pressure control.  Medication compliance advised.      2. Type 2 diabetes mellitus without complication, without long-term current use of insulin (HCC)  -     AMB POC HEMOGLOBIN A1C  -     AMB POC GLUCOSE, QUANTITATIVE, BLOOD  -     MICROALBUMIN, UR, RAND W/ MICROALBUMIN/CREA RATIO  -     AMB POC URINALYSIS DIP STICK AUTO W/ MICRO    3. Mixed hyperlipidemia  -     LIPID PANEL    4. Paroxysmal SVT (supraventricular tachycardia) (HCC)//  5. Atherosclerosis of native coronary artery of native heart without angina pectoris  Stable     6. Encounter for medication monitoring  -     METABOLIC PANEL, COMPREHENSIVE  -     CBC W/O DIFF    7. Encounter for screening mammogram for breast cancer  -     [facility] MAMMO BI SCREENING INCL CAD; Future      Follow-up Disposition:  Return in about 5 months (around 3/17/2018). reviewed diet, exercise and weight control  cardiovascular risk and specific lipid/LDL goals reviewed  reviewed medications and side effects in detail  specific diabetic recommendations: low cholesterol diet, weight control and daily exercise discussed, home glucose monitoring emphasized, all medications, side effects and compliance discussed carefully, foot care discussed and Podiatry visits discussed, annual eye examinations at Ophthalmology discussed and glycohemoglobin and other lab monitoring discussed     I have discussed diagnosis listed in this note with pt and/or family. I have discussed treatment plans and options and the risk/benefit analysis of those options, including safe use of medications and possible medication side effects. Through the use of shared decision making we have agreed to the above plan. The patient has received an after-visit summary and questions were answered concerning future plans and follow up. Advise pt of any urgent changes then to proceed to the ER.

## 2017-10-17 ENCOUNTER — OFFICE VISIT (OUTPATIENT)
Dept: FAMILY MEDICINE CLINIC | Age: 82
End: 2017-10-17

## 2017-10-17 ENCOUNTER — HOSPITAL ENCOUNTER (OUTPATIENT)
Dept: LAB | Age: 82
Discharge: HOME | End: 2017-10-17
Payer: MEDICARE

## 2017-10-17 VITALS
WEIGHT: 183.8 LBS | TEMPERATURE: 95.1 F | DIASTOLIC BLOOD PRESSURE: 82 MMHG | OXYGEN SATURATION: 97 % | BODY MASS INDEX: 30.62 KG/M2 | HEIGHT: 65 IN | RESPIRATION RATE: 16 BRPM | SYSTOLIC BLOOD PRESSURE: 137 MMHG | HEART RATE: 59 BPM

## 2017-10-17 DIAGNOSIS — E11.9 TYPE 2 DIABETES MELLITUS WITHOUT COMPLICATION, WITHOUT LONG-TERM CURRENT USE OF INSULIN (HCC): ICD-10-CM

## 2017-10-17 DIAGNOSIS — I25.10 ATHEROSCLEROSIS OF NATIVE CORONARY ARTERY OF NATIVE HEART WITHOUT ANGINA PECTORIS: ICD-10-CM

## 2017-10-17 DIAGNOSIS — I10 ESSENTIAL HYPERTENSION: Primary | ICD-10-CM

## 2017-10-17 DIAGNOSIS — I47.1 PAROXYSMAL SVT (SUPRAVENTRICULAR TACHYCARDIA) (HCC): ICD-10-CM

## 2017-10-17 DIAGNOSIS — Z51.81 ENCOUNTER FOR MEDICATION MONITORING: ICD-10-CM

## 2017-10-17 DIAGNOSIS — Z12.31 ENCOUNTER FOR SCREENING MAMMOGRAM FOR BREAST CANCER: ICD-10-CM

## 2017-10-17 DIAGNOSIS — E78.2 MIXED HYPERLIPIDEMIA: ICD-10-CM

## 2017-10-17 LAB
BILIRUB UR QL STRIP: NEGATIVE
GLUCOSE POC: 128 MG/DL
GLUCOSE UR-MCNC: NEGATIVE MG/DL
HBA1C MFR BLD HPLC: 6.3 %
KETONES P FAST UR STRIP-MCNC: NEGATIVE MG/DL
PH UR STRIP: 6 [PH] (ref 4.6–8)
PROT UR QL STRIP: NEGATIVE MG/DL
SP GR UR STRIP: 1.01 (ref 1–1.03)
UA UROBILINOGEN AMB POC: NORMAL (ref 0.2–1)
URINALYSIS CLARITY POC: CLEAR
URINALYSIS COLOR POC: YELLOW
URINE BLOOD POC: NEGATIVE
URINE LEUKOCYTES POC: NORMAL
URINE NITRITES POC: NEGATIVE

## 2017-10-17 PROCEDURE — 85027 COMPLETE CBC AUTOMATED: CPT

## 2017-10-17 PROCEDURE — 82043 UR ALBUMIN QUANTITATIVE: CPT

## 2017-10-17 PROCEDURE — 80053 COMPREHEN METABOLIC PANEL: CPT

## 2017-10-17 PROCEDURE — 36415 COLL VENOUS BLD VENIPUNCTURE: CPT

## 2017-10-17 PROCEDURE — 80061 LIPID PANEL: CPT

## 2017-10-17 NOTE — MR AVS SNAPSHOT
Visit Information Date & Time Provider Department Dept. Phone Encounter #  
 10/17/2017  8:00 AM Kimberlyn Ludwig MD [facility] [phone] [id number] Follow-up Instructions Return in about 5 months (around 3/17/2018). Upcoming Health Maintenance Date Due  
 EYE EXAM RETINAL OR DILATED Q1 10/14/2016 MICROALBUMIN Q1 11/8/2017 HEMOGLOBIN A1C Q6M 11/16/2017 FOOT EXAM Q1 5/16/2018 LIPID PANEL Q1 5/16/2018 MEDICARE YEARLY EXAM 5/17/2018 GLAUCOMA SCREENING Q2Y 4/25/2019 DTaP/Tdap/Td series (2 - Td) 5/16/2027 Allergies as of 10/17/2017  Review Complete On: 10/17/2017 By: Kimberlyn Ludwig MD  
  
 Severity Noted Reaction Type Reactions Metformin Medium 04/09/2013   Systemic Hives, Swelling Pt taking Metformin- pt states doesn't think she is allergic Atenolol  02/23/2010    Diarrhea Lisinopril  02/23/2010    Swelling Lip swelling Lozol [Indapamide]  02/23/2010    Unknown (comments) Skelaxin [Metaxalone]  02/23/2010    Unknown (comments) Pt does not recall Current Immunizations  Reviewed on 10/17/2017 Name Date Pneumococcal Conjugate (PCV-13) 2/3/2015 Pneumococcal Polysaccharide (PPSV-23) 5/16/2017 Td, Adsorbed PF 2/9/2013  1:52 AM  
  
 Reviewed by Kimberlyn Ludwig MD on 10/17/2017 at  8:24 AM  
You Were Diagnosed With   
  
 Codes Comments Essential hypertension    -  Primary ICD-10-CM: I10 
ICD-9-CM: 401.9 Type 2 diabetes mellitus without complication, without long-term current use of insulin (HCC)     ICD-10-CM: E11.9 ICD-9-CM: 250.00 Mixed hyperlipidemia     ICD-10-CM: E78.2 ICD-9-CM: 272.2 Paroxysmal SVT (supraventricular tachycardia) (HCC)     ICD-10-CM: I47.1 ICD-9-CM: 427.0 Atherosclerosis of native coronary artery of native heart without angina pectoris     ICD-10-CM: I25.10 ICD-9-CM: 414.01  Encounter for medication monitoring     ICD-10-CM: Z51.81 
 ICD-9-CM: V58.83 Encounter for screening mammogram for breast cancer     ICD-10-CM: Z12.31 
ICD-9-CM: V76.12 Vitals BP Pulse Temp Resp Height(growth percentile) Weight(growth percentile) 137/82 (BP 1 Location: Right arm, BP Patient Position: Sitting) (!) 59 95.1 °F (35.1 °C) (Oral) 16 5' 4.5\" (1.638 m) 183 lb 12.8 oz (83.4 kg) SpO2 BMI OB Status Smoking Status 97% 31.06 kg/m2 Postmenopausal Former Smoker Vitals History BMI and BSA Data Body Mass Index Body Surface Area 31.06 kg/m 2 1.95 m 2 Preferred Pharmacy Pharmacy Name Phone [facility] [address] [employer] [employer]  [place] [phone] Your Updated Medication List  
  
   
This list is accurate as of: 10/17/17  9:07 AM.  Always use your most recent med list.  
  
  
  
  
 ALEVE 220 mg Cap Generic drug:  naproxen sodium Take 2 Caps by mouth as needed. allopurinol 300 mg tablet Commonly known as:  ZYLOPRIM  
TAKE 1 TABLET BY MOUTH ONCE DAILY  
  
 aspirin delayed-release 81 mg tablet Take 1 tablet by mouth daily. atorvastatin 40 mg tablet Commonly known as:  LIPITOR  
TAKE 1 TABLET BY MOUTH DAILY  
  
 cholecalciferol (vitamin D3) 2,000 unit Tab Take  by mouth. clopidogrel 75 mg Tab Commonly known as:  PLAVIX TAKE 1 TABLET BY MOUTH EVERY DAY  
  
 glucose blood VI test strips strip Commonly known as:  ASCENSIA AUTODISC VI, ONE TOUCH ULTRA TEST VI  
As directed  
  
 losartan-hydroCHLOROthiazide 100-12.5 mg per tablet Commonly known as:  HYZAAR  
TAKE 1 TABLET BY MOUTH DAILY. REPLACES STANDALONE LOSARTAN ON 1/28/16  
  
 metFORMIN  mg tablet Commonly known as:  GLUCOPHAGE XR  
TAKE 1 TABLET BY MOUTH DAILY WITH DINNER  
  
 metoprolol tartrate 50 mg tablet Commonly known as:  LOPRESSOR  
TAKE 2 TABLETS BY MOUTH TWICE DAILY We Performed the Following AMB POC GLUCOSE, QUANTITATIVE, BLOOD [07193 CPT(R)] AMB POC HEMOGLOBIN A1C [11913 CPT(R)] AMB POC URINALYSIS DIP STICK AUTO W/ MICRO [03331 CPT(R)] CBC W/O DIFF [89479 CPT(R)] LIPID PANEL [94530 CPT(R)] METABOLIC PANEL, COMPREHENSIVE [02592 CPT(R)] MICROALBUMIN, UR, RAND W/ MICROALBUMIN/CREA RATIO [id number] CPT(R)] Follow-up Instructions Return in about 5 months (around 3/17/2018). To-Do List   
 10/17/2017 Imaging:  BELEM MAMMO BI SCREENING INCL CAD Introducing \[facility]\"" & HEALTH SERVICES! Roberto Zuniga introduces [employer] patient portal. Now you can access parts of your medical record, email your doctor's office, and request medication refills online. 1. In your internet browser, go to [email or web address] 2. Click on the First Time User? Click Here link in the Sign In box. You will see the New Member Sign Up page. 3. Enter your [employer] Access Code exactly as it appears below. You will not need to use this code after youve completed the sign-up process. If you do not sign up before the expiration date, you must request a new code. · [employer] Access Code: [email or web address] Expires: 11/26/2017  1:27 PM 
 
4. Enter the last four digits of your Social Security Number (xxxx) and Date of Birth (mm/dd/yyyy) as indicated and click Submit. You will be taken to the next sign-up page. 5. Create a [employer] ID. This will be your [employer] login ID and cannot be changed, so think of one that is secure and easy to remember. 6. Create a [employer] password. You can change your password at any time. 7. Enter your Password Reset Question and Answer. This can be used at a later time if you forget your password. 8. Enter your e-mail address. You will receive e-mail notification when new information is available in [address] 9. Click Sign Up. You can now view and download portions of your medical record.  
10. Click the Download Summary menu link to download a portable copy of your medical information. If you have questions, please visit the Frequently Asked Questions section of the [employer] website. Remember, [employer] is NOT to be used for urgent needs. For medical emergencies, dial 911. Now available from your iPhone and Android! Please provide this summary of care documentation to your next provider. Your primary care clinician is listed as Jessica Son. If you have any questions after today's visit, please call [phone].

## 2017-10-17 NOTE — PROGRESS NOTES
Chief Complaint   Patient presents with    Hypertension    Diabetes     BMP 5/16/17  Lipid 5/16/17  A1C 5/16/17  Vit D 5/16/17  Micro albumin 11/8/16  Mammogram 11/8/16  Eye exam 4/25/17 Dr. Tong Humphrey

## 2017-10-18 LAB
ALBUMIN SERPL-MCNC: 4.5 G/DL (ref 3.5–4.7)
ALBUMIN/CREAT UR: 4.2 MG/G CREAT (ref 0–30)
ALBUMIN/GLOB SERPL: 1.7 {RATIO} (ref 1.2–2.2)
ALP SERPL-CCNC: 63 IU/L (ref 39–117)
ALT SERPL-CCNC: 13 IU/L (ref 0–32)
AST SERPL-CCNC: 18 IU/L (ref 0–40)
BILIRUB SERPL-MCNC: 0.5 MG/DL (ref 0–1.2)
BUN SERPL-MCNC: 9 MG/DL (ref 8–27)
BUN/CREAT SERPL: 9 (ref 12–28)
CALCIUM SERPL-MCNC: 10.1 MG/DL (ref 8.7–10.3)
CHLORIDE SERPL-SCNC: 96 MMOL/L (ref 96–106)
CHOLEST SERPL-MCNC: 147 MG/DL (ref 100–199)
CO2 SERPL-SCNC: 25 MMOL/L (ref 18–29)
CREAT SERPL-MCNC: 1.02 MG/DL (ref 0.57–1)
CREAT UR-MCNC: 112.1 MG/DL
ERYTHROCYTE [DISTWIDTH] IN BLOOD BY AUTOMATED COUNT: 15.7 % (ref 12.3–15.4)
GLOBULIN SER CALC-MCNC: 2.7 G/DL (ref 1.5–4.5)
GLUCOSE SERPL-MCNC: 114 MG/DL (ref 65–99)
HCT VFR BLD AUTO: 38.3 % (ref 34–46.6)
HDLC SERPL-MCNC: 54 MG/DL
HGB BLD-MCNC: 13.5 G/DL (ref 11.1–15.9)
INTERPRETATION, 910389: NORMAL
INTERPRETATION: NORMAL
LDLC SERPL CALC-MCNC: 76 MG/DL (ref 0–99)
Lab: NORMAL
MCH RBC QN AUTO: 30.8 PG (ref 26.6–33)
MCHC RBC AUTO-ENTMCNC: 35.2 G/DL (ref 31.5–35.7)
MCV RBC AUTO: 87 FL (ref 79–97)
MICROALBUMIN UR-MCNC: 4.7 UG/ML
PDF IMAGE, 910387: NORMAL
PLATELET # BLD AUTO: 222 X10E3/UL (ref 150–379)
POTASSIUM SERPL-SCNC: 4.2 MMOL/L (ref 3.5–5.2)
PROT SERPL-MCNC: 7.2 G/DL (ref 6–8.5)
RBC # BLD AUTO: 4.39 X10E6/UL (ref 3.77–5.28)
SODIUM SERPL-SCNC: 138 MMOL/L (ref 134–144)
TRIGL SERPL-MCNC: 85 MG/DL (ref 0–149)
VLDLC SERPL CALC-MCNC: 17 MG/DL (ref 5–40)
WBC # BLD AUTO: 5.6 X10E3/UL (ref 3.4–10.8)

## 2017-10-22 RX ORDER — LOSARTAN POTASSIUM AND HYDROCHLOROTHIAZIDE 12.5; 1 MG/1; MG/1
TABLET ORAL
Qty: 90 TAB | Refills: 0 | Status: SHIPPED | OUTPATIENT
Start: 2017-10-22 | End: 2018-02-09 | Stop reason: SDUPTHER

## 2017-12-05 ENCOUNTER — HOSPITAL ENCOUNTER (OUTPATIENT)
Dept: MAMMOGRAPHY | Age: 82
Discharge: HOME | End: 2017-12-05
Attending: FAMILY MEDICINE
Payer: MEDICARE

## 2017-12-05 DIAGNOSIS — Z12.31 ENCOUNTER FOR SCREENING MAMMOGRAM FOR BREAST CANCER: ICD-10-CM

## 2017-12-05 PROCEDURE — 77067 SCR MAMMO BI INCL CAD: CPT

## 2018-02-20 RX ORDER — AMOXICILLIN 500 MG/1
500 CAPSULE ORAL 3 TIMES DAILY
Qty: 21 CAP | Refills: 0 | Status: SHIPPED | OUTPATIENT
Start: 2018-02-20 | End: 2018-02-27

## 2018-02-20 NOTE — TELEPHONE ENCOUNTER
Patient informed Dr. Lino Corcoran will be sending a RX to the pharm.   Patient verbalized understanding

## 2018-02-20 NOTE — TELEPHONE ENCOUNTER
Patient would like Lurdes Villanueva to call her regarding a sore throat. Patient said she has gargled salt water and everything and it has not helped. Patient can be reached at [phone].

## 2018-03-05 ENCOUNTER — TELEPHONE (OUTPATIENT)
Dept: FAMILY MEDICINE CLINIC | Age: 83
End: 2018-03-05

## 2018-03-05 NOTE — TELEPHONE ENCOUNTER
Spoke with Attila Ro, patient's daughter, earlier this morning and states patient had fallen 4-5 days ago and hit her head. Patient went to Patient Venita Gupta states patient is still c/o dizziness. Informed Attila Ro if patient is still having dizziness patient should go to ER and have a CT of head done. Attila Ro asked which hospital should she go to. Informed Attila Ro if she went to a [facility] Dr. Jerry Helton would be able to see report. Attila Ro verbalized understanding but still wanted patient to be seen by Dr. Jerry Helton. Informed Attila Ro will speak with Dr. Jerry Helton and call back. Attila Ro can be reached at [phone].

## 2018-03-06 NOTE — TELEPHONE ENCOUNTER
Called pt's daughter , Marlon Cruz Dr. Gil can see pt tomorrow at 9:15am.    Called pt and informed that an appt has been made for tomorrow at 9:15am. Pt stated she did not go to the ER yesterday as advised because she was not having any dizziness. Pt stated she did not need an appt with Dr. Gil. Writer asked pt if she was still having any dizziness. Pt states she is not having anymore dizziness and will not need an appt. Pt [facility] Dr. Gil made aware.

## 2018-07-07 DIAGNOSIS — I47.1 SVT (SUPRAVENTRICULAR TACHYCARDIA) (HCC): Primary | ICD-10-CM

## 2018-07-09 RX ORDER — CLOPIDOGREL BISULFATE 75 MG/1
TABLET ORAL
Qty: 30 TAB | Refills: 0 | Status: SHIPPED | OUTPATIENT
Start: 2018-07-09 | End: 2019-02-17 | Stop reason: SDUPTHER

## 2018-10-11 ENCOUNTER — OFFICE VISIT (OUTPATIENT)
Dept: CARDIOLOGY CLINIC | Age: 83
End: 2018-10-11

## 2018-10-11 VITALS
RESPIRATION RATE: 18 BRPM | BODY MASS INDEX: 28.13 KG/M2 | SYSTOLIC BLOOD PRESSURE: 110 MMHG | HEART RATE: 75 BPM | WEIGHT: 164.8 LBS | HEIGHT: 64 IN | DIASTOLIC BLOOD PRESSURE: 80 MMHG | OXYGEN SATURATION: 98 %

## 2018-10-11 DIAGNOSIS — E11.9 TYPE 2 DIABETES MELLITUS WITHOUT COMPLICATION, WITHOUT LONG-TERM CURRENT USE OF INSULIN (HCC): ICD-10-CM

## 2018-10-11 DIAGNOSIS — I10 ESSENTIAL HYPERTENSION: ICD-10-CM

## 2018-10-11 DIAGNOSIS — I47.1 PAROXYSMAL SVT (SUPRAVENTRICULAR TACHYCARDIA) (HCC): ICD-10-CM

## 2018-10-11 DIAGNOSIS — E78.2 MIXED HYPERLIPIDEMIA: ICD-10-CM

## 2018-10-11 DIAGNOSIS — Z98.61 POSTSURGICAL PERCUTANEOUS TRANSLUMINAL CORONARY ANGIOPLASTY STATUS: ICD-10-CM

## 2018-10-11 DIAGNOSIS — I25.10 ATHEROSCLEROSIS OF NATIVE CORONARY ARTERY OF NATIVE HEART WITHOUT ANGINA PECTORIS: Primary | ICD-10-CM

## 2018-10-11 PROBLEM — E11.21 TYPE 2 DIABETES WITH NEPHROPATHY (HCC): Status: ACTIVE | Noted: 2018-10-11

## 2018-10-11 NOTE — MR AVS SNAPSHOT
[address] [facility] 
[phone] Patient: Lionel Christensen MRN:  [id number] Visit Information Date & Time Provider Department Dept. Phone Encounter #  
 10/11/2018 11:30 AM Sae Grissom [facility] [phone] [id number] Your Appointments 10/17/2019  9:00 AM  
ESTABLISHED PATIENT with Evelyn Albarran MD  
[facility] [address] [place] [address]) Appt Note: annual f/u  
 [address] [facility]  
[phone] [address] [facility] Upcoming Health Maintenance Date Due Shingrix Vaccine Age 50> (1 of 2) 8/23/1981 HEMOGLOBIN A1C Q6M 4/17/2018 EYE EXAM RETINAL OR DILATED Q1 4/20/2018 FOOT EXAM Q1 5/16/2018 MEDICARE YEARLY EXAM 5/17/2018 Influenza Age 5 to Adult 8/1/2018 MICROALBUMIN Q1 10/17/2018 LIPID PANEL Q1 10/17/2018 GLAUCOMA SCREENING Q2Y 4/25/2019 DTaP/Tdap/Td series (2 - Td) 5/16/2027 Allergies as of 10/11/2018  Review Complete On: 10/11/2018 By: Evelyn Albarran MD  
  
 Severity Noted Reaction Type Reactions Metformin Medium 04/09/2013   Systemic Hives, Swelling Pt taking Metformin- pt states doesn't think she is allergic Atenolol  02/23/2010    Diarrhea Lisinopril  02/23/2010    Swelling Lip swelling Lozol [Indapamide]  02/23/2010    Unknown (comments) Skelaxin [Metaxalone]  02/23/2010    Unknown (comments) Pt does not recall Current Immunizations  Reviewed on 10/17/2017 Name Date Pneumococcal Conjugate (PCV-13) 2/3/2015 Pneumococcal Polysaccharide (PPSV-23) 5/16/2017 Td, Adsorbed PF 2/9/2013  1:52 AM  
  
 Not reviewed this visit You Were Diagnosed With   
  
 Codes Comments Atherosclerosis of native coronary artery of native heart without angina pectoris    -  Primary ICD-10-CM: I25.10 ICD-9-CM: 414.01   
 Essential hypertension     ICD-10-CM: I10 
ICD-9-CM: 401.9 Paroxysmal SVT (supraventricular tachycardia) (HCC)     ICD-10-CM: I47.1 ICD-9-CM: 427.0 Postsurgical percutaneous transluminal coronary angioplasty status     ICD-10-CM: Z98.61 ICD-9-CM: V45.82 Mixed hyperlipidemia     ICD-10-CM: E78.2 ICD-9-CM: 272.2 Type 2 diabetes mellitus without complication, without long-term current use of insulin (HCC)     ICD-10-CM: E11.9 ICD-9-CM: 250.00 Vitals BP Pulse Resp Height(growth percentile) Weight(growth percentile) SpO2  
 110/80 (BP 1 Location: Right arm, BP Patient Position: Sitting) 75 18 5' 4\" (1.626 m) 164 lb 12.8 oz (74.8 kg) 98% BMI OB Status Smoking Status 28.29 kg/m2 Postmenopausal Former Smoker Vitals History BMI and BSA Data Body Mass Index Body Surface Area  
 28.29 kg/m 2 1.84 m 2 Preferred Pharmacy Pharmacy Name Phone [address] [place] [address]  [place] [phone] Your Updated Medication List  
  
   
This list is accurate as of 10/11/18 12:19 PM.  Always use your most recent med list.  
  
  
  
  
 ALEVE 220 mg Cap Generic drug:  naproxen sodium Take 2 Caps by mouth as needed. allopurinol 300 mg tablet Commonly known as:  ZYLOPRIM  
TAKE 1 TABLET BY MOUTH ONCE DAILY  
  
 aspirin delayed-release 81 mg tablet Take 1 tablet by mouth daily. atorvastatin 40 mg tablet Commonly known as:  LIPITOR  
TAKE 1 TABLET BY MOUTH DAILY  
  
 cholecalciferol (vitamin D3) 2,000 unit Tab Take  by mouth daily. clopidogrel 75 mg Tab Commonly known as:  PLAVIX TAKE 1 TABLET BY MOUTH EVERY DAY  
  
 losartan-hydroCHLOROthiazide 100-12.5 mg per tablet Commonly known as:  HYZAAR  
TAKE 1 TABLET BY MOUTH DAILY. REPLACES STANDALONE LOSARTAN ON 1/28/16  
  
 metFORMIN  mg tablet Commonly known as:  GLUCOPHAGE XR  
 TAKE 1 TABLET BY MOUTH DAILY WITH DINNER  
  
 metoprolol tartrate 50 mg tablet Commonly known as:  LOPRESSOR  
TAKE 2 TABLETS BY MOUTH TWICE DAILY We Performed the Following AMB POC EKG ROUTINE W/ 12 LEADS, INTER & REP [79115 CPT(R)] Introducing [facility] & HEALTH SERVICES! [employer] introduces [employer] patient portal. Now you can access parts of your medical record, email your doctor's office, and request medication refills online. 1. In your internet browser, go to [email or web address] 2. Click on the First Time User? Click Here link in the Sign In box. You will see the New Member Sign Up page. 3. Enter your [employer] Access Code exactly as it appears below. You will not need to use this code after youve completed the sign-up process. If you do not sign up before the expiration date, you must request a new code. · [employer] Access Code: [email or web address] Expires: 1/9/2019 11:00 AM 
 
4. Enter the last four digits of your Social Security Number (xxxx) and Date of Birth (mm/dd/yyyy) as indicated and click Submit. You will be taken to the next sign-up page. 5. Create a [employer] ID. This will be your [employer] login ID and cannot be changed, so think of one that is secure and easy to remember. 6. Create a [employer] password. You can change your password at any time. 7. Enter your Password Reset Question and Answer. This can be used at a later time if you forget your password. 8. Enter your e-mail address. You will receive e-mail notification when new information is available in [address] 9. Click Sign Up. You can now view and download portions of your medical record. 10. Click the Download Summary menu link to download a portable copy of your medical information. If you have questions, please visit the Frequently Asked Questions section of the [employer] website. Remember, [employer] is NOT to be used for urgent needs. For medical emergencies, dial 911. Now available from your iPhone and Android! Please provide this summary of care documentation to your next provider. Your primary care clinician is listed as Milad Valdez. If you have any questions after today's visit, please call [phone].

## 2018-10-11 NOTE — PROGRESS NOTES
1. Have you been to the ER, urgent care clinic since your last visit? Hospitalized since your last visit? No. 
 
2. Have you seen or consulted any other health care providers outside of the [address] [place] [address] since your last visit? Include any pap smears or colon screening. No. 
 
 
Chief Complaint Patient presents with  Annual Exam  
  pt denies any cardiac symptoms

## 2018-10-11 NOTE — PROGRESS NOTES
[address] [address]  [phone] Subjective:  
  
Bijan Cervantes is a 80 y.o. female is here for routine f/u. The patient denies chest pain/ shortness of breath, orthopnea, PND, LE edema, palpitations, syncope, or presyncope. Doing well. Patient Active Problem List  
 Diagnosis Date Noted  Type 2 diabetes with nephropathy ([facility] [address] 10/11/2018  Encounter for medication monitoring 11/08/2016  Type 2 diabetes mellitus without complication ([facility] [facility] 34/90/7139  Essential hypertension 06/02/2015  Paroxysmal SVT (supraventricular tachycardia) ([facility] [address] 06/20/2012  SVT (supraventricular tachycardia) ([facility] [address] [facility] 06/20/2012  Mitral valve disease 04/25/2012  Coronary atherosclerosis of native coronary artery 04/25/2012  Obesity, unspecified 04/25/2012  Premature beats, unspecified 04/25/2012  Mixed hyperlipidemia 04/25/2012  Postsurgical percutaneous transluminal coronary angioplasty status 04/25/2012  Hypovitaminosis D 02/12/2012  LFT'S ABNORMAL 05/25/2010  Chronic low back pain 02/23/2010  CAD (coronary artery disease) 02/23/2010  Gout 02/23/2010 Frank Coleman MD 
Past Medical History:  
Diagnosis Date  CAD (coronary artery disease) 2/23/2010  Cataract  Chest pain, unspecified  Chronic low back pain 2/23/2010  DM (diabetes mellitus) ([facility] [address] [facility] 2/23/2010  Gout 2/23/2010  
 HTN (hypertension) 2/23/2010  Hyperlipidemia 2/23/2010  Other acute and subacute form of ischemic heart disease  Palpitations  Postsurgical percutaneous transluminal coronary angioplasty status 4/25/2012  Tachycardia, unspecified Past Surgical History:  
Procedure Laterality Date  HX CATARACT REMOVAL  july 2010  HX PTCA  TN COLONOSCOPY FLX DX W/COLLJ SPEC WHEN PFRMD  09/2005  
 repeat in 10 yrs Dr. Panchal Rising Allergies Allergen Reactions  Metformin Hives and Swelling Pt taking Metformin- pt states doesn't think she is allergic  Atenolol Diarrhea  Lisinopril Swelling Lip swelling  Lozol [Indapamide] Unknown (comments)  Skelaxin [Metaxalone] Unknown (comments) Pt does not recall Family History Problem Relation Age of Onset  Cancer Mother   
     breast  
 Breast Cancer Mother  No Known Problems Sister  No Known Problems Brother  Gout Brother  Gout Brother  No Known Problems Brother  No Known Problems Brother  No Known Problems Brother  No Known Problems Sister Social History Socioeconomic History  Marital status:  Spouse name: Not on file  Number of children: Not on file  Years of education: Not on file  Highest education level: Not on file Social Needs  Financial resource strain: Not on file  Food insecurity - worry: Not on file  Food insecurity - inability: Not on file  Transportation needs - medical: Not on file  Transportation needs - non-medical: Not on file Occupational History  Not on file Tobacco Use  Smoking status: Former Smoker Types: Cigarettes Last attempt to quit: 1984 Years since quittin.8  Smokeless tobacco: Never Used Substance and Sexual Activity  Alcohol use: No  
  Alcohol/week: 0.0 oz  Drug use: No  
 Sexual activity: Not Currently Other Topics Concern  Not on file Social History Narrative  Not on file Current Outpatient Medications Medication Sig  clopidogrel (PLAVIX) 75 mg tab TAKE 1 TABLET BY MOUTH EVERY DAY  losartan-hydroCHLOROthiazide (HYZAAR) 100-12.5 mg per tablet TAKE 1 TABLET BY MOUTH DAILY. REPLACES STANDALONE LOSARTAN ON 16 (Patient taking differently: TAKE 1 TABLET BY MOUTH DAILY. REPLACES STANDALONE LOSARTAN ON 16-PT TAKING 1/2 TAB DAILY)  allopurinol (ZYLOPRIM) 300 mg tablet TAKE 1 TABLET BY MOUTH ONCE DAILY  metoprolol tartrate (LOPRESSOR) 50 mg tablet TAKE 2 TABLETS BY MOUTH TWICE DAILY (Patient taking differently: 1 TAB ONCE DAILY)  metFORMIN ER (GLUCOPHAGE XR) 500 mg tablet TAKE 1 TABLET BY MOUTH DAILY WITH DINNER  cholecalciferol, vitamin D3, 2,000 unit tab Take  by mouth daily.  atorvastatin (LIPITOR) 40 mg tablet TAKE 1 TABLET BY MOUTH DAILY  aspirin delayed-release 81 mg tablet Take 1 tablet by mouth daily. (Patient taking differently: Take 81 mg by mouth. Indications: TWICE WEEKLY)  naproxen sodium (ALEVE) 220 mg cap Take 2 Caps by mouth as needed. No current facility-administered medications for this visit. Review of Symptoms: 
11 systems reviewed, negative other than as stated in the HPI Physical ExamPhysical Exam:   
Vitals:  
 10/11/18 1103 10/11/18 1120 BP: 104/70 110/80 Pulse: 75 Resp: 18 SpO2: 98% Weight: 164 lb 12.8 oz (74.8 kg) Height: 5' 4\" (1.626 m) Body mass index is 28.29 kg/m². General PE Gen:  NAD Mental Status - Alert. General Appearance - Not in acute distress. Chest and Lung Exam  
Inspection: Accessory muscles - No use of accessory muscles in breathing. Auscultation:  
Breath sounds: - Normal.  
Cardiovascular Inspection: Jugular vein - Bilateral - Inspection Normal.  
Palpation/Percussion:  
Apical Impulse: - Normal.  
Auscultation: Rhythm - Regular. Heart Sounds - S1 WNL and S2 WNL. No S3 or S4. Murmurs & Other Heart Sounds: Auscultation of the heart reveals - No Murmurs. Peripheral Vascular Upper Extremity: Inspection - Bilateral - No Cyanotic nailbeds or Digital clubbing. Lower Extremity:  
Palpation: Edema - Bilateral - No edema. Abdomen:   Soft, non-tender, bowel sounds are active. Neuro: A&O times 3, CN and motor grossly WNL Labs:  
Lab Results Component Value Date/Time  Cholesterol, total 147 10/17/2017 08:42 AM  
 Cholesterol, total 139 05/16/2017 09:15 AM  
 Cholesterol, total 145 11/08/2016 08:57 AM  
 Cholesterol, total 142 06/07/2016 08:54 AM  
 Cholesterol, total 143 11/03/2015 08:43 AM  
 HDL Cholesterol 54 10/17/2017 08:42 AM  
 HDL Cholesterol 53 05/16/2017 09:15 AM  
 HDL Cholesterol 64 11/08/2016 08:57 AM  
 HDL Cholesterol 51 06/07/2016 08:54 AM  
 HDL Cholesterol 59 11/03/2015 08:43 AM  
 LDL, calculated 76 10/17/2017 08:42 AM  
 LDL, calculated 68 05/16/2017 09:15 AM  
 LDL, calculated 58 11/08/2016 08:57 AM  
 LDL, calculated 68 06/07/2016 08:54 AM  
 LDL, calculated 68 11/03/2015 08:43 AM  
 Triglyceride 85 10/17/2017 08:42 AM  
 Triglyceride 89 05/16/2017 09:15 AM  
 Triglyceride 117 11/08/2016 08:57 AM  
 Triglyceride 116 06/07/2016 08:54 AM  
 Triglyceride 79 11/03/2015 08:43 AM  
 CHOL/HDL Ratio 2.4 08/27/2010 01:01 PM  
 CHOL/HDL Ratio 2.4 03/26/2010 12:18 PM  
 CHOL/HDL Ratio 3.1 11/06/2009 12:15 PM  
 CHOL/HDL Ratio 2.9 07/08/2009 01:18 PM  
 CHOL/HDL Ratio 2.5 04/08/2009 12:17 PM  
 
Lab Results Component Value Date/Time  02/09/2013 01:47 AM  
 
Lab Results Component Value Date/Time Sodium 138 10/17/2017 08:42 AM  
 Potassium 4.2 10/17/2017 08:42 AM  
 Chloride 96 10/17/2017 08:42 AM  
 CO2 25 10/17/2017 08:42 AM  
 Anion gap 9 02/09/2013 01:47 AM  
 Glucose 114 (H) 10/17/2017 08:42 AM  
 BUN 9 10/17/2017 08:42 AM  
 Creatinine 1.02 (H) 10/17/2017 08:42 AM  
 BUN/Creatinine ratio 9 (L) 10/17/2017 08:42 AM  
 GFR est AA 58 (L) 10/17/2017 08:42 AM  
 GFR est non-AA 50 (L) 10/17/2017 08:42 AM  
 Calcium 10.1 10/17/2017 08:42 AM  
 Bilirubin, total 0.5 10/17/2017 08:42 AM  
 AST (SGOT) 18 10/17/2017 08:42 AM  
 Alk. phosphatase 63 10/17/2017 08:42 AM  
 Protein, total 7.2 10/17/2017 08:42 AM  
 Albumin 4.5 10/17/2017 08:42 AM  
 Globulin 3.1 11/09/2010 10:31 AM  
 A-G Ratio 1.7 10/17/2017 08:42 AM  
 ALT (SGPT) 13 10/17/2017 08:42 AM  
 
 
EKG: 
NSR 1AVB Assessment: 
 
 Assessment: 1. Atherosclerosis of native coronary artery of native heart without angina pectoris 2. Essential hypertension 3. Paroxysmal SVT (supraventricular tachycardia) (HCC) 4. Postsurgical percutaneous transluminal coronary angioplasty status 5. Mixed hyperlipidemia 6. Type 2 diabetes mellitus without complication, without long-term current use of insulin ([address] Orders Placed This Encounter  AMB POC EKG ROUTINE W/ 12 LEADS, INTER & REP Order Specific Question:   Reason for Exam: Answer:   routine Plan:  
 
Patient presents doing well and stable from cardiac standpoint. CAD  
9/13 Nuclear stress test without evidence of Ischemia Continue ASA/Plavix due to 2 history of complex bifurcation stenting of the LAD. HTN Controlled with current therapy HLD 
10/17 LDL at 76. On statin. Lipids and labs followed by PCP. DM On oral agents Hx Palpitations, SVT 
2/13 Holter showed SB with PACs Maintaining SR. Denies any episode of palpitation On BB Continue current care and f/u in 12 mos Logan Gonzales MD

## 2019-02-17 DIAGNOSIS — I47.1 SVT (SUPRAVENTRICULAR TACHYCARDIA) (HCC): ICD-10-CM

## 2019-02-17 RX ORDER — CLOPIDOGREL BISULFATE 75 MG/1
TABLET ORAL
Qty: 30 TAB | Refills: 0 | Status: SHIPPED | OUTPATIENT
Start: 2019-02-17

## 2019-10-17 ENCOUNTER — OFFICE VISIT (OUTPATIENT)
Dept: CARDIOLOGY CLINIC | Age: 84
End: 2019-10-17

## 2019-10-17 VITALS
DIASTOLIC BLOOD PRESSURE: 90 MMHG | WEIGHT: 171.8 LBS | RESPIRATION RATE: 16 BRPM | HEIGHT: 64 IN | BODY MASS INDEX: 29.33 KG/M2 | HEART RATE: 60 BPM | OXYGEN SATURATION: 96 % | SYSTOLIC BLOOD PRESSURE: 170 MMHG

## 2019-10-17 DIAGNOSIS — I10 ESSENTIAL HYPERTENSION: ICD-10-CM

## 2019-10-17 DIAGNOSIS — Z98.61 POSTSURGICAL PERCUTANEOUS TRANSLUMINAL CORONARY ANGIOPLASTY STATUS: ICD-10-CM

## 2019-10-17 DIAGNOSIS — I47.1 PAROXYSMAL SVT (SUPRAVENTRICULAR TACHYCARDIA) (HCC): ICD-10-CM

## 2019-10-17 DIAGNOSIS — E78.2 MIXED HYPERLIPIDEMIA: ICD-10-CM

## 2019-10-17 DIAGNOSIS — I25.10 ATHEROSCLEROSIS OF NATIVE CORONARY ARTERY OF NATIVE HEART WITHOUT ANGINA PECTORIS: Primary | ICD-10-CM

## 2019-10-17 DIAGNOSIS — E11.9 TYPE 2 DIABETES MELLITUS WITHOUT COMPLICATION, WITHOUT LONG-TERM CURRENT USE OF INSULIN (HCC): ICD-10-CM

## 2019-10-17 DIAGNOSIS — I47.1 SVT (SUPRAVENTRICULAR TACHYCARDIA) (HCC): ICD-10-CM

## 2019-10-17 RX ORDER — METOPROLOL SUCCINATE 50 MG/1
50 TABLET, EXTENDED RELEASE ORAL DAILY
COMMUNITY
Start: 2019-08-01

## 2019-10-17 NOTE — LETTER
10/17/19 Patient: Kelly Garcia YOB: 1931 Date of Visit: 10/17/2019 Jean-Pierre Parikh MD 
[place] [address] VIA Facsimile: [phone] Dear Jean-Pierre Parikh MD, Thank you for referring Ms. Latonia Diaz to [facility] for evaluation. My notes for this consultation are attached. If you have questions, please do not hesitate to call me. I look forward to following your patient along with you.  
 
 
Sincerely, 
 
Hailee Ha MD

## 2019-10-17 NOTE — PROGRESS NOTES
1. Have you been to the ER, urgent care clinic since your last visit? Hospitalized since your last visit? no    2. Have you seen or consulted any other health care providers outside of the [address] [place] [address] since your last visit? Include any pap smears or colon screening. Yes PCP    Chief Complaint   Patient presents with    Follow-up    Coronary Artery Disease    Patient has no cardiac complaints.

## 2019-10-17 NOTE — PROGRESS NOTES
[address], [address] [facility]  [phone]     Subjective:      Destini Priest is a 80 y.o. female is here for routine f/u on CAD HTN HLD. She continues to live independently and drive. She goes to [employer] three times a week,does exercise and socialize, no exertional  Cp or richard. The patient denies chest pain/ shortness of breath, orthopnea, PND, LE edema, palpitations, syncope, or presyncope.        Patient Active Problem List    Diagnosis Date Noted    Type 2 diabetes with nephropathy ([facility] 10/11/2018    Encounter for medication monitoring 11/08/2016    Type 2 diabetes mellitus without complication ([facility] 40/79/8479    Essential hypertension 06/02/2015    Paroxysmal SVT (supraventricular tachycardia) ([facility] 06/20/2012    SVT (supraventricular tachycardia) ([facility] 06/20/2012    Mitral valve disease 04/25/2012    Coronary atherosclerosis of native coronary artery 04/25/2012    Obesity, unspecified 04/25/2012    Premature beats, unspecified 04/25/2012    Mixed hyperlipidemia 04/25/2012    Postsurgical percutaneous transluminal coronary angioplasty status 04/25/2012    Hypovitaminosis D 02/12/2012    LFT'S ABNORMAL 05/25/2010    Chronic low back pain 02/23/2010    CAD (coronary artery disease) 02/23/2010    Gout 02/23/2010      Laury Rios MD  Past Medical History:   Diagnosis Date    CAD (coronary artery disease) 2/23/2010    Cataract     Chest pain, unspecified     Chronic low back pain 2/23/2010    DM (diabetes mellitus) ([facility] 2/23/2010    Gout 2/23/2010    HTN (hypertension) 2/23/2010    Hyperlipidemia 2/23/2010    Other acute and subacute form of ischemic heart disease     Palpitations     Postsurgical percutaneous transluminal coronary angioplasty status 4/25/2012    Tachycardia, unspecified       Past Surgical History:   Procedure Laterality Date    HX CATARACT REMOVAL  july 2010    HX PTCA      CA COLONOSCOPY FLX DX W/COLLJ SPEC WHEN PFRMD  2005    repeat in 10 yrs Dr. Melanie Fitzgerald   Allergen Reactions    Metformin Hives and Swelling     Pt taking Metformin- pt states doesn't think she is allergic    Atenolol Diarrhea    Lisinopril Swelling     Lip swelling    Lozol [Indapamide] Unknown (comments)    Skelaxin [Metaxalone] Unknown (comments)     Pt does not recall      Family History   Problem Relation Age of Onset    Cancer Mother         breast    Breast Cancer Mother     No Known Problems Sister     No Known Problems Brother     Gout Brother     Gout Brother     No Known Problems Brother     No Known Problems Brother     No Known Problems Brother     No Known Problems Sister       Social History     Socioeconomic History    Marital status:      Spouse name: Not on file    Number of children: Not on file    Years of education: Not on file    Highest education level: Not on file   Occupational History    Not on file   Social Needs    Financial resource strain: Not on file    Food insecurity:     Worry: Not on file     Inability: Not on file    Transportation needs:     Medical: Not on file     Non-medical: Not on file   Tobacco Use    Smoking status: Former Smoker     Types: Cigarettes     Last attempt to quit: 1984     Years since quittin.8    Smokeless tobacco: Never Used   Substance and Sexual Activity    Alcohol use: No     Alcohol/week: 0.0 standard drinks    Drug use: No    Sexual activity: Not Currently   Lifestyle    Physical activity:     Days per week: Not on file     Minutes per session: Not on file    Stress: Not on file   Relationships    Social connections:     Talks on phone: Not on file     Gets together: Not on file     Attends [employer] service: Not on file     Active member of club or organization: Not on file     Attends meetings of clubs or organizations: Not on file     Relationship status: Not on file    Intimate partner violence:     Fear of current or ex partner: Not on file     Emotionally abused: Not on file     Physically abused: Not on file     Forced sexual activity: Not on file   Other Topics Concern    Not on file   Social History Narrative    Not on file      Current Outpatient Medications   Medication Sig    metoprolol succinate (TOPROL-XL) 50 mg XL tablet Take 50 mg by mouth daily.  clopidogrel (PLAVIX) 75 mg tab TAKE 1 TABLET BY MOUTH EVERY DAY    losartan-hydroCHLOROthiazide (HYZAAR) 100-12.5 mg per tablet TAKE 1 TABLET BY MOUTH DAILY. REPLACES STANDALONE LOSARTAN ON 1/28/16 (Patient taking differently: TAKE 1 TABLET BY MOUTH DAILY. REPLACES STANDALONE LOSARTAN ON 1/28/16-PT TAKING 1/2 TAB DAILY)    allopurinol (ZYLOPRIM) 300 mg tablet TAKE 1 TABLET BY MOUTH ONCE DAILY    metFORMIN ER (GLUCOPHAGE XR) 500 mg tablet TAKE 1 TABLET BY MOUTH DAILY WITH DINNER    cholecalciferol, vitamin D3, 2,000 unit tab Take  by mouth daily.  atorvastatin (LIPITOR) 40 mg tablet TAKE 1 TABLET BY MOUTH DAILY    aspirin delayed-release 81 mg tablet Take 1 tablet by mouth daily. (Patient taking differently: Take 81 mg by mouth. Indications: TWICE WEEKLY)    naproxen sodium (ALEVE) 220 mg cap Take 2 Caps by mouth as needed. No current facility-administered medications for this visit. Review of Symptoms:  11 systems reviewed, negative other than as stated in the HPI    Physical ExamPhysical Exam:    Vitals:    10/17/19 0915 10/17/19 0916   BP: (!) 180/100 170/90   Pulse: 60    Resp: 16    SpO2: 96%    Weight: 171 lb 12.8 oz (77.9 kg)    Height: 5' 4\" (1.626 m)      Body mass index is 29.49 kg/m². General PE  Gen:  NAD  Mental Status - Alert. General Appearance - Not in acute distress. HEENT:  PERRL, no carotid bruits or JVD  Chest and Lung Exam   Inspection: Accessory muscles - No use of accessory muscles in breathing.    Auscultation:   Breath sounds: - Normal.   Cardiovascular   Inspection: Jugular vein - Bilateral - Inspection Normal. Palpation/Percussion:   Apical Impulse: - Normal.   Auscultation: Rhythm - Regular. Heart Sounds - S1 WNL and S2 WNL. No S3 or S4. Murmurs & Other Heart Sounds: Auscultation of the heart reveals - No Murmurs. Peripheral Vascular   Upper Extremity: Inspection - Bilateral - No Cyanotic nailbeds or Digital clubbing. Lower Extremity:   Palpation: Edema - Bilateral - No edema. Abdomen:   Soft, non-tender, bowel sounds are active.   Neuro: A&O times 3, CN and motor grossly WNL    Labs:   Lab Results   Component Value Date/Time    Cholesterol, total 147 10/17/2017 08:42 AM    Cholesterol, total 139 05/16/2017 09:15 AM    Cholesterol, total 145 11/08/2016 08:57 AM    Cholesterol, total 142 06/07/2016 08:54 AM    Cholesterol, total 143 11/03/2015 08:43 AM    HDL Cholesterol 54 10/17/2017 08:42 AM    HDL Cholesterol 53 05/16/2017 09:15 AM    HDL Cholesterol 64 11/08/2016 08:57 AM    HDL Cholesterol 51 06/07/2016 08:54 AM    HDL Cholesterol 59 11/03/2015 08:43 AM    LDL, calculated 76 10/17/2017 08:42 AM    LDL, calculated 68 05/16/2017 09:15 AM    LDL, calculated 58 11/08/2016 08:57 AM    LDL, calculated 68 06/07/2016 08:54 AM    LDL, calculated 68 11/03/2015 08:43 AM    Triglyceride 85 10/17/2017 08:42 AM    Triglyceride 89 05/16/2017 09:15 AM    Triglyceride 117 11/08/2016 08:57 AM    Triglyceride 116 06/07/2016 08:54 AM    Triglyceride 79 11/03/2015 08:43 AM    CHOL/HDL Ratio 2.4 08/27/2010 01:01 PM    CHOL/HDL Ratio 2.4 03/26/2010 12:18 PM    CHOL/HDL Ratio 3.1 11/06/2009 12:15 PM    CHOL/HDL Ratio 2.9 07/08/2009 01:18 PM    CHOL/HDL Ratio 2.5 04/08/2009 12:17 PM     Lab Results   Component Value Date/Time     02/09/2013 01:47 AM     Lab Results   Component Value Date/Time    Sodium 138 10/17/2017 08:42 AM    Potassium 4.2 10/17/2017 08:42 AM    Chloride 96 10/17/2017 08:42 AM    CO2 25 10/17/2017 08:42 AM    Anion gap 9 02/09/2013 01:47 AM    Glucose 114 (H) 10/17/2017 08:42 AM    BUN 9 10/17/2017 08:42 AM Creatinine 1.02 (H) 10/17/2017 08:42 AM    BUN/Creatinine ratio 9 (L) 10/17/2017 08:42 AM    GFR est AA 58 (L) 10/17/2017 08:42 AM    GFR est non-AA 50 (L) 10/17/2017 08:42 AM    Calcium 10.1 10/17/2017 08:42 AM    Bilirubin, total 0.5 10/17/2017 08:42 AM    AST (SGOT) 18 10/17/2017 08:42 AM    Alk. phosphatase 63 10/17/2017 08:42 AM    Protein, total 7.2 10/17/2017 08:42 AM    Albumin 4.5 10/17/2017 08:42 AM    Globulin 3.1 11/09/2010 10:31 AM    A-G Ratio 1.7 10/17/2017 08:42 AM    ALT (SGPT) 13 10/17/2017 08:42 AM       EKG:   NSR RBBB     Assessment:     Assessment:      1. Atherosclerosis of native coronary artery of native heart without angina pectoris    2. Essential hypertension    3. Paroxysmal SVT (supraventricular tachycardia) ([address]    4. Mixed hyperlipidemia    5. Postsurgical percutaneous transluminal coronary angioplasty status    6. SVT (supraventricular tachycardia) ([address]    7. Type 2 diabetes mellitus without complication, without long-term current use of insulin ([address]        Orders Placed This Encounter    CK    LIPID PANEL    METABOLIC PANEL, COMPREHENSIVE    AMB POC EKG ROUTINE W/ 12 LEADS, INTER & REP     Order Specific Question:   Reason for Exam:     Answer:   routine    metoprolol succinate (TOPROL-XL) 50 mg XL tablet     Sig: Take 50 mg by mouth daily. Plan:     Patient presents for f/u, doing well and stable from cardiac standpoint. She continues to live independently and drive. She goes to [employer] three times a week,does exercise and socialize, no exertional  Cp or richard.     CAD   9/13 Nuclear stress test without evidence of Ischemia  Continue ASA/Plavix due to 2 history of complex bifurcation stenting of the LAD.     HTN  Elevated, but has not taken morning medications.   She will have them monitor blood pressures at the [facility], always taking medications at ahead of time, and let us know if blood pressure is generally greater than 150/90.     HLD  10/17 LDL at 76. On statin. Will check labs     DM  On oral agents     Hx Palpitations, SVT  2/13 Holter showed SB with PACs  Maintaining SR.   Denies any episode of palpitation  On BB       Continue current care and f/u in 12 mos, sooner as needed.         Celine Masters MD

## 2019-10-18 LAB
ALBUMIN SERPL-MCNC: 4.3 G/DL (ref 3.5–4.7)
ALBUMIN/GLOB SERPL: 1.7 {RATIO} (ref 1.2–2.2)
ALP SERPL-CCNC: 64 IU/L (ref 39–117)
ALT SERPL-CCNC: 10 IU/L (ref 0–32)
AST SERPL-CCNC: 14 IU/L (ref 0–40)
BILIRUB SERPL-MCNC: 0.4 MG/DL (ref 0–1.2)
BUN SERPL-MCNC: 14 MG/DL (ref 8–27)
BUN/CREAT SERPL: 14 (ref 12–28)
CALCIUM SERPL-MCNC: 9.7 MG/DL (ref 8.7–10.3)
CHLORIDE SERPL-SCNC: 100 MMOL/L (ref 96–106)
CHOLEST SERPL-MCNC: 135 MG/DL (ref 100–199)
CK SERPL-CCNC: 94 U/L (ref 24–173)
CO2 SERPL-SCNC: 22 MMOL/L (ref 20–29)
CREAT SERPL-MCNC: 0.98 MG/DL (ref 0.57–1)
GLOBULIN SER CALC-MCNC: 2.5 G/DL (ref 1.5–4.5)
GLUCOSE SERPL-MCNC: 93 MG/DL (ref 65–99)
HDLC SERPL-MCNC: 74 MG/DL
INTERPRETATION, 910389: NORMAL
INTERPRETATION: NORMAL
LDLC SERPL CALC-MCNC: 50 MG/DL (ref 0–99)
Lab: NORMAL
PDF IMAGE, 910387: NORMAL
POTASSIUM SERPL-SCNC: 4.6 MMOL/L (ref 3.5–5.2)
PROT SERPL-MCNC: 6.8 G/DL (ref 6–8.5)
SODIUM SERPL-SCNC: 138 MMOL/L (ref 134–144)
TRIGL SERPL-MCNC: 53 MG/DL (ref 0–149)
VLDLC SERPL CALC-MCNC: 11 MG/DL (ref 5–40)

## 2019-10-23 NOTE — PROGRESS NOTES
Cherry,    Please call patient and inform that lipids are controlled. Renal/liver function stable. Keep all meds same.     Thanks,  [employer]

## 2019-10-24 ENCOUNTER — TELEPHONE (OUTPATIENT)
Dept: CARDIOLOGY CLINIC | Age: 84
End: 2019-10-24

## 2019-10-24 NOTE — TELEPHONE ENCOUNTER
----- Message from Isatu Gonzales NP sent at 10/23/2019  4:19 PM EDT -----  Jazmin Turner,    Please call patient and inform that lipids are controlled. Renal/liver function stable. Keep all meds same.     Thanks,  [employer]

## 2020-10-22 ENCOUNTER — HOSPITAL ENCOUNTER (OUTPATIENT)
Dept: LAB | Age: 85
Discharge: HOME | End: 2020-10-22
Payer: MEDICARE

## 2020-10-22 ENCOUNTER — OFFICE VISIT (OUTPATIENT)
Dept: CARDIOLOGY CLINIC | Age: 85
End: 2020-10-22
Payer: MEDICARE

## 2020-10-22 VITALS
BODY MASS INDEX: 28.51 KG/M2 | HEIGHT: 64 IN | DIASTOLIC BLOOD PRESSURE: 72 MMHG | HEART RATE: 56 BPM | WEIGHT: 167 LBS | SYSTOLIC BLOOD PRESSURE: 132 MMHG | RESPIRATION RATE: 18 BRPM

## 2020-10-22 DIAGNOSIS — E78.2 MIXED HYPERLIPIDEMIA: ICD-10-CM

## 2020-10-22 DIAGNOSIS — I10 ESSENTIAL HYPERTENSION: ICD-10-CM

## 2020-10-22 DIAGNOSIS — I25.10 ATHEROSCLEROSIS OF NATIVE CORONARY ARTERY OF NATIVE HEART WITHOUT ANGINA PECTORIS: Primary | ICD-10-CM

## 2020-10-22 DIAGNOSIS — I47.1 PAROXYSMAL SVT (SUPRAVENTRICULAR TACHYCARDIA) (HCC): ICD-10-CM

## 2020-10-22 PROCEDURE — G0463 HOSPITAL OUTPT CLINIC VISIT: HCPCS | Performed by: INTERNAL MEDICINE

## 2020-10-22 PROCEDURE — 3288F FALL RISK ASSESSMENT DOCD: CPT | Performed by: INTERNAL MEDICINE

## 2020-10-22 PROCEDURE — G8427 DOCREV CUR MEDS BY ELIG CLIN: HCPCS | Performed by: INTERNAL MEDICINE

## 2020-10-22 PROCEDURE — 36415 COLL VENOUS BLD VENIPUNCTURE: CPT

## 2020-10-22 PROCEDURE — 93005 ELECTROCARDIOGRAM TRACING: CPT | Performed by: INTERNAL MEDICINE

## 2020-10-22 PROCEDURE — G8536 NO DOC ELDER MAL SCRN: HCPCS | Performed by: INTERNAL MEDICINE

## 2020-10-22 PROCEDURE — 80061 LIPID PANEL: CPT

## 2020-10-22 PROCEDURE — G8419 CALC BMI OUT NRM PARAM NOF/U: HCPCS | Performed by: INTERNAL MEDICINE

## 2020-10-22 PROCEDURE — G8510 SCR DEP NEG, NO PLAN REQD: HCPCS | Performed by: INTERNAL MEDICINE

## 2020-10-22 PROCEDURE — 93010 ELECTROCARDIOGRAM REPORT: CPT | Performed by: INTERNAL MEDICINE

## 2020-10-22 PROCEDURE — 99214 OFFICE O/P EST MOD 30 MIN: CPT | Performed by: INTERNAL MEDICINE

## 2020-10-22 PROCEDURE — 1100F PTFALLS ASSESS-DOCD GE2>/YR: CPT | Performed by: INTERNAL MEDICINE

## 2020-10-22 PROCEDURE — 1090F PRES/ABSN URINE INCON ASSESS: CPT | Performed by: INTERNAL MEDICINE

## 2020-10-22 PROCEDURE — 82550 ASSAY OF CK (CPK): CPT

## 2020-10-22 PROCEDURE — 80053 COMPREHEN METABOLIC PANEL: CPT

## 2020-10-22 RX ORDER — DICLOFENAC SODIUM 10 MG/G
GEL TOPICAL
COMMUNITY
Start: 2020-10-01 | End: 2020-10-22

## 2020-10-22 NOTE — PROGRESS NOTES
Dea Jones, [facility]    Subjective/HPI:     Ms. Jose Estrada is a 80 y.o. female is here for routine follow-up. She has a PMHx of CAD, HTN, HLD and DM2. She is doing well. She denies complaints of chest pains, dizziness, orthopnea, PND or edema. She denies shortness of breath or palpitation symptoms. She stopped walking daily due to laziness. She does go to the store and walks every aisle at least once a week. She cut back her ASA to two times a week per instructions from PCP. PCP Provider  Lionel Muir MD    Patient Active Problem List   Diagnosis Code    Chronic low back pain M54.5, G89.29    Gout M10.9    LFT'S ABNORMAL     Hypovitaminosis D E55.9    Coronary atherosclerosis of native coronary artery I25.10    Obesity, unspecified E66.9    Premature beats, unspecified I49.49    Mixed hyperlipidemia E78.2    Postsurgical percutaneous transluminal coronary angioplasty status Z98.61    Paroxysmal SVT (supraventricular tachycardia) ([facility] I47.1    Essential hypertension I10    Encounter for medication monitoring Z51.81    Type 2 diabetes with nephropathy ([facility] E11.21       Social History     Tobacco Use    Smoking status: Former Smoker     Packs/day: 1.00     Years: 30.00     Pack years: 30.00     Types: Cigarettes     Last attempt to quit: 1984     Years since quittin.8    Smokeless tobacco: Never Used   Substance Use Topics    Alcohol use: Yes     Alcohol/week: 1.0 standard drinks     Types: 1 Shots of liquor per week     Comment: rare       Current Outpatient Medications   Medication Sig    metoprolol succinate (TOPROL-XL) 50 mg XL tablet Take 50 mg by mouth daily.  clopidogrel (PLAVIX) 75 mg tab TAKE 1 TABLET BY MOUTH EVERY DAY    losartan-hydroCHLOROthiazide (HYZAAR) 100-12.5 mg per tablet TAKE 1 TABLET BY MOUTH DAILY. REPLACES STANDALONE LOSARTAN ON 16 (Patient taking differently: TAKE 1 TABLET BY MOUTH DAILY.  REPLACES STANDALONE LOSARTAN ON 16-PT TAKING 1/2 TAB DAILY)    allopurinol (ZYLOPRIM) 300 mg tablet TAKE 1 TABLET BY MOUTH ONCE DAILY    metFORMIN ER (GLUCOPHAGE XR) 500 mg tablet TAKE 1 TABLET BY MOUTH DAILY WITH DINNER    cholecalciferol, vitamin D3, 2,000 unit tab Take  by mouth daily.  atorvastatin (LIPITOR) 40 mg tablet TAKE 1 TABLET BY MOUTH DAILY    aspirin delayed-release 81 mg tablet Take 1 tablet by mouth daily. (Patient taking differently: Take 81 mg by mouth. Indications: TWICE WEEKLY)    naproxen sodium (ALEVE) 220 mg cap Take 2 Caps by mouth as needed. No current facility-administered medications for this visit. Allergies   Allergen Reactions    Metformin Hives and Swelling     Pt taking Metformin- pt states doesn't think she is allergic    Atenolol Diarrhea    Lisinopril Swelling     Lip swelling    Lozol [Indapamide] Unknown (comments)    Skelaxin [Metaxalone] Unknown (comments)     Pt does not recall        I have reviewed the problem list, allergy list, medical history, family, social history and medications. Review of Symptoms:    Review of Systems   Constitutional: Negative for chills, fever and weight loss. HENT: Negative for nosebleeds. Eyes: Negative for blurred vision and double vision. Respiratory: Negative for cough, shortness of breath and wheezing. Cardiovascular: Negative for chest pain, palpitations, orthopnea, leg swelling and PND. Gastrointestinal: Negative for abdominal pain, blood in stool, diarrhea, nausea and vomiting. Musculoskeletal: Negative for joint pain. Skin: Negative for rash. Neurological: Negative for dizziness, tingling and loss of consciousness. Endo/Heme/Allergies: Does not bruise/bleed easily. Physical Exam:      General: Well developed, in no acute distress, cooperative and alert  HEENT: No carotid bruits, no JVD, trach is midline. Neck Supple, PEERL, EOM intact. Heart:  reg rate and rhythm; normal S1/S2; no murmurs, no gallops or rubs. Respiratory: Clear bilaterally x 4, no wheezing or rales  Abdomen:   Soft, non-tender, no distention, no masses. + BS. Extremities:  Normal cap refill, no cyanosis, atraumatic. No edema. Neuro: A&Ox3, speech clear, gait stable. Skin: Skin color is normal. No rashes or lesions. Non diaphoretic  Vascular: 2+ pulses symmetric in all extremities    Vitals:    10/22/20 0906 10/22/20 0942   BP: (!) 140/80 132/72   Pulse: (!) 56    Resp: 18    Weight: 167 lb (75.8 kg)    Height: 5' 4\" (1.626 m)        ECG: sinus bradycardia; RBBB    Cardiology Labs:    Lab Results   Component Value Date/Time    Cholesterol, total 135 10/17/2019 10:18 AM    HDL Cholesterol 74 10/17/2019 10:18 AM    LDL, calculated 50 10/17/2019 10:18 AM    LDL, calculated 76 10/17/2017 08:42 AM    LDL, calculated 68 05/16/2017 09:15 AM    VLDL, calculated 11 10/17/2019 10:18 AM    CHOL/HDL Ratio 2.4 08/27/2010 01:01 PM       Lab Results   Component Value Date/Time    Hemoglobin A1c 6.5 (H) 12/17/2013 08:07 AM    Hemoglobin A1c (POC) 6.3 10/17/2017 08:30 AM       Lab Results   Component Value Date/Time    Sodium 138 10/17/2019 10:18 AM    Potassium 4.6 10/17/2019 10:18 AM    Chloride 100 10/17/2019 10:18 AM    CO2 22 10/17/2019 10:18 AM    Glucose 93 10/17/2019 10:18 AM    BUN 14 10/17/2019 10:18 AM    Creatinine 0.98 10/17/2019 10:18 AM    BUN/Creatinine ratio 14 10/17/2019 10:18 AM    GFR est AA 60 10/17/2019 10:18 AM    GFR est non-AA 52 (L) 10/17/2019 10:18 AM    Calcium 9.7 10/17/2019 10:18 AM    Anion gap 9 02/09/2013 01:47 AM    Bilirubin, total 0.4 10/17/2019 10:18 AM    ALT (SGPT) 10 10/17/2019 10:18 AM    Alk.  phosphatase 64 10/17/2019 10:18 AM    Protein, total 6.8 10/17/2019 10:18 AM    Albumin 4.3 10/17/2019 10:18 AM    Globulin 3.1 11/09/2010 10:31 AM    A-G Ratio 1.7 10/17/2019 10:18 AM       Orders Placed This Encounter    AMB POC EKG ROUTINE W/ 12 LEADS, INTER & REP     Order Specific Question:   Reason for Exam:     Answer: routine    DISCONTD: diclofenac (VOLTAREN) 1 % gel     Sig: JATINDER 2 GRAMS EXT AA QID        Assessment:     Assessment:       ICD-10-CM ICD-9-CM    1. Atherosclerosis of native coronary artery of native heart without angina pectoris  I25.10 414.01 AMB POC EKG ROUTINE W/ 12 LEADS, INTER & REP   2. Paroxysmal SVT (supraventricular tachycardia) (HCC)  I47.1 427.0    3. Essential hypertension  I10 401.9    4. Mixed hyperlipidemia  E78.2 272.2         Plan:     1. Atherosclerosis of native coronary artery of native heart without angina pectoris  Hx of bifurcating stents to the LAD  Lexiscan stress test done 9/2013 without evidence of ischemia  Without anginal or anginal equivalent symptoms  Continue Toprol, statin. Continue ASA/Plavix long term d/t hx of bifurcating stents. Advised to continue ASA daily. 2. Paroxysmal SVT (supraventricular tachycardia) (HCC)  No recurrence  Continue BB therapy    3. Essential hypertension  BP controlled. Continue anti-hypertensive therapy and low sodium diet    4.  Mixed hyperlipidemia  LDL 50 in 10/2019  Repeat lipids for this year  Continue statin therapy    F/u with Dr. Carlos Donovan in 1 year    Jessica Wolf NP

## 2020-10-22 NOTE — PROGRESS NOTES
1. Have you been to the ER, urgent care clinic since your last visit? Hospitalized since your last visit? No    2. Have you seen or consulted any other health care providers outside of the [address] [place] [address] since your last visit? Include any pap smears or colon screening. No    Chief Complaint   Patient presents with    Coronary Artery Disease     Yearly appt. Denied cardiac symptoms.

## 2020-10-23 LAB
ALBUMIN SERPL-MCNC: 4.5 G/DL (ref 3.6–4.6)
ALBUMIN/GLOB SERPL: 1.8 {RATIO} (ref 1.2–2.2)
ALP SERPL-CCNC: 73 IU/L (ref 39–117)
ALT SERPL-CCNC: 15 IU/L (ref 0–32)
AST SERPL-CCNC: 16 IU/L (ref 0–40)
BILIRUB SERPL-MCNC: 0.5 MG/DL (ref 0–1.2)
BUN SERPL-MCNC: 11 MG/DL (ref 8–27)
BUN/CREAT SERPL: 11 (ref 12–28)
CALCIUM SERPL-MCNC: 9.6 MG/DL (ref 8.7–10.3)
CHLORIDE SERPL-SCNC: 101 MMOL/L (ref 96–106)
CHOLEST SERPL-MCNC: 143 MG/DL (ref 100–199)
CK SERPL-CCNC: 63 U/L (ref 26–161)
CO2 SERPL-SCNC: 24 MMOL/L (ref 20–29)
CREAT SERPL-MCNC: 1.01 MG/DL (ref 0.57–1)
GLOBULIN SER CALC-MCNC: 2.5 G/DL (ref 1.5–4.5)
GLUCOSE SERPL-MCNC: 107 MG/DL (ref 65–99)
HDLC SERPL-MCNC: 66 MG/DL
INTERPRETATION, 910389: NORMAL
INTERPRETATION: NORMAL
LDLC SERPL CALC-MCNC: 63 MG/DL (ref 0–99)
PDF IMAGE, 910387: NORMAL
POTASSIUM SERPL-SCNC: 4.6 MMOL/L (ref 3.5–5.2)
PROT SERPL-MCNC: 7 G/DL (ref 6–8.5)
SODIUM SERPL-SCNC: 137 MMOL/L (ref 134–144)
TRIGL SERPL-MCNC: 68 MG/DL (ref 0–149)
VLDLC SERPL CALC-MCNC: 14 MG/DL (ref 5–40)

## 2020-10-23 NOTE — PROGRESS NOTES
Cherry,    Please call the patient and inform that lipids remain at goal.  Continue all meds, no changes.     Thanks,  [employer]

## 2020-10-23 NOTE — PROGRESS NOTES
Spoke to patient using 2 identifiers. Per Kary Aranda NP, patient was made aware that lipids remain at goal.   Continue all meds, no changes. Patient verbalized understanding.

## 2021-10-28 ENCOUNTER — OFFICE VISIT (OUTPATIENT)
Dept: CARDIOLOGY CLINIC | Age: 86
End: 2021-10-28
Payer: MEDICARE

## 2021-10-28 VITALS
HEIGHT: 64 IN | BODY MASS INDEX: 28.9 KG/M2 | SYSTOLIC BLOOD PRESSURE: 140 MMHG | HEART RATE: 60 BPM | DIASTOLIC BLOOD PRESSURE: 70 MMHG | WEIGHT: 169.3 LBS

## 2021-10-28 DIAGNOSIS — I47.1 PAROXYSMAL SVT (SUPRAVENTRICULAR TACHYCARDIA) (HCC): Primary | ICD-10-CM

## 2021-10-28 DIAGNOSIS — I25.10 ATHEROSCLEROSIS OF NATIVE CORONARY ARTERY OF NATIVE HEART WITHOUT ANGINA PECTORIS: ICD-10-CM

## 2021-10-28 DIAGNOSIS — E11.21 TYPE 2 DIABETES WITH NEPHROPATHY (HCC): ICD-10-CM

## 2021-10-28 DIAGNOSIS — Z98.61 POSTSURGICAL PERCUTANEOUS TRANSLUMINAL CORONARY ANGIOPLASTY STATUS: ICD-10-CM

## 2021-10-28 DIAGNOSIS — E78.2 MIXED HYPERLIPIDEMIA: ICD-10-CM

## 2021-10-28 DIAGNOSIS — I10 ESSENTIAL HYPERTENSION: ICD-10-CM

## 2021-10-28 PROCEDURE — 93005 ELECTROCARDIOGRAM TRACING: CPT | Performed by: INTERNAL MEDICINE

## 2021-10-28 PROCEDURE — G8536 NO DOC ELDER MAL SCRN: HCPCS | Performed by: INTERNAL MEDICINE

## 2021-10-28 PROCEDURE — 99214 OFFICE O/P EST MOD 30 MIN: CPT | Performed by: INTERNAL MEDICINE

## 2021-10-28 PROCEDURE — G8510 SCR DEP NEG, NO PLAN REQD: HCPCS | Performed by: INTERNAL MEDICINE

## 2021-10-28 PROCEDURE — 1090F PRES/ABSN URINE INCON ASSESS: CPT | Performed by: INTERNAL MEDICINE

## 2021-10-28 PROCEDURE — G0463 HOSPITAL OUTPT CLINIC VISIT: HCPCS | Performed by: INTERNAL MEDICINE

## 2021-10-28 PROCEDURE — G8427 DOCREV CUR MEDS BY ELIG CLIN: HCPCS | Performed by: INTERNAL MEDICINE

## 2021-10-28 PROCEDURE — G8419 CALC BMI OUT NRM PARAM NOF/U: HCPCS | Performed by: INTERNAL MEDICINE

## 2021-10-28 PROCEDURE — 93010 ELECTROCARDIOGRAM REPORT: CPT | Performed by: INTERNAL MEDICINE

## 2021-10-28 PROCEDURE — 1101F PT FALLS ASSESS-DOCD LE1/YR: CPT | Performed by: INTERNAL MEDICINE

## 2021-10-28 RX ORDER — LOSARTAN POTASSIUM AND HYDROCHLOROTHIAZIDE 12.5; 5 MG/1; MG/1
1 TABLET ORAL DAILY
COMMUNITY
Start: 2021-08-09

## 2021-10-28 NOTE — PROGRESS NOTES
Chief Complaint   Patient presents with    Follow-up    Coronary Artery Disease    Hypertension    Cholesterol Problem    Irregular Heart Beat     1. Have you been to the ER, urgent care clinic since your last visit? No Hospitalized since your last visit? No    2. Have you seen or consulted any other health care providers outside of the [address] [place] [address] since your last visit? Yes PCP  Include any pap smears or colon screening.  No.

## 2021-10-28 NOTE — LETTER
10/28/2021    Patient: Tatianna Bland   YOB: 1931   Date of Visit: 10/28/2021     Ghazal Bravo MD  [address] [facility] [address]  Via Fax: [phone]    Dear Ghazal Bravo MD,      Thank you for referring Ms. Raoul Guaman to [facility] for evaluation. My notes for this consultation are attached. If you have questions, please do not hesitate to call me. I look forward to following your patient along with you.       Sincerely,    Eduar Betancourt MD

## 2021-10-28 NOTE — PROGRESS NOTES
Katelyn Fermin, [facility]    Subjective/HPI:     Danny Ortiz is a 80 y.o. female is here for routine f/u. She has a PMHx of CAD, HTN, HLD and DM2. She is doing well. Came off metformin as blood sugars were controlled and per PCP, not much benefit at her age. Denies complaints of chest pains, dizziness, orthopnea, PND or edema. Denies shortness of breath or palpitation symptoms. The patient initially thought she was off of Plavix, but realized that she is taking clopidogrel, so she has never stopped this medication. Still only taking ASA twice a week -- had recommended last year to take this daily. Walks every aisle of the grocery store with her daughter regularly. She says that is exercise enough for her.     Past Medical History:   Diagnosis Date    Atrial fibrillation (HCC)     CAD (coronary artery disease) 2/23/2010    Cataract     Chest pain, unspecified     Chronic low back pain 2/23/2010    DM (diabetes mellitus) ([facility] [address] 2/23/2010    Gout 2/23/2010    HTN (hypertension) 2/23/2010    Hyperlipidemia 2/23/2010    Long term current use of anticoagulant therapy     ASA 2 times weekly    Other acute and subacute form of ischemic heart disease     Palpitations     Postsurgical percutaneous transluminal coronary angioplasty status 4/25/2012    Tachycardia, unspecified        Past Surgical History:   Procedure Laterality Date    HX CATARACT REMOVAL  july 2010    HX CORONARY STENT PLACEMENT      HX HEART CATHETERIZATION      HX PTCA      UT COLONOSCOPY FLX DX W/COLLJ SPEC WHEN PFRMD  09/2005    repeat in 10 yrs Dr. Werner Kline       Family History   Problem Relation Age of Onset    Cancer Mother         breast    Breast Cancer Mother     No Known Problems Sister     No Known Problems Brother     Gout Brother     Gout Brother     No Known Problems Brother     No Known Problems Brother     No Known Problems Brother     No Known Problems Sister        Social History Socioeconomic History    Marital status:      Spouse name: Not on file    Number of children: Not on file    Years of education: Not on file    Highest education level: Not on file   Occupational History    Not on file   Tobacco Use    Smoking status: Former Smoker     Packs/day: 1.00     Years: 30.00     Pack years: 30.00     Types: Cigarettes     Quit date: 1984     Years since quittin.8    Smokeless tobacco: Never Used   Substance and Sexual Activity    Alcohol use: Yes     Alcohol/week: 1.0 standard drinks     Types: 1 Shots of liquor per week     Comment: rare    Drug use: No    Sexual activity: Not Currently     Partners: Male   Other Topics Concern    Not on file   Social History Narrative    Not on file     Social Determinants of Health     Financial Resource Strain:     Difficulty of Paying Living Expenses:    Food Insecurity:     Worried About Running Out of Food in the Last Year:     [address] in the Last Year:    Transportation Needs:     Lack of Transportation (Medical):  Lack of Transportation (Non-Medical):    Physical Activity:     Days of Exercise per Week:     Minutes of Exercise per Session:    Stress:     Feeling of Stress :    Social Connections:     Frequency of Communication with Friends and Family:     Frequency of Social Gatherings with Friends and Family:     Attends Rastafarian Services:     Active Member of Clubs or Organizations:     Attends Club or Organization Meetings:     Marital Status:    Intimate Partner Violence:     Fear of Current or Ex-Partner:     Emotionally Abused:     Physically Abused:     Sexually Abused:         Allergies   Allergen Reactions    Metformin Hives and Swelling     Pt taking Metformin- pt states doesn't think she is allergic    Atenolol Diarrhea    Lisinopril Swelling     Lip swelling    Lozol [Indapamide] Unknown (comments)    Skelaxin [Metaxalone] Unknown (comments)     Pt does not recall Current Outpatient Medications on File Prior to Visit   Medication Sig Dispense Refill    losartan-hydroCHLOROthiazide (HYZAAR) 50-12.5 mg per tablet Take 1 Tablet by mouth daily.  metoprolol succinate (TOPROL-XL) 50 mg XL tablet Take 50 mg by mouth daily.  clopidogrel (PLAVIX) 75 mg tab TAKE 1 TABLET BY MOUTH EVERY DAY 30 Tab 0    allopurinol (ZYLOPRIM) 300 mg tablet TAKE 1 TABLET BY MOUTH ONCE DAILY 90 Tab 3    cholecalciferol, vitamin D3, 2,000 unit tab Take  by mouth daily.  atorvastatin (LIPITOR) 40 mg tablet TAKE 1 TABLET BY MOUTH DAILY 90 Tab 11    aspirin delayed-release 81 mg tablet Take 1 tablet by mouth daily. (Patient taking differently: Take 81 mg by mouth. Indications: TWICE WEEKLY) 30 tablet 11    naproxen sodium (ALEVE) 220 mg cap Take 2 Caps by mouth as needed. No current facility-administered medications on file prior to visit. Review of Symptoms:    Review of Systems   Constitutional: Negative for chills, fever and weight loss. HENT: Negative for nosebleeds. Eyes: Negative for blurred vision and double vision. Respiratory: Negative for cough, shortness of breath and wheezing. Cardiovascular: Negative for chest pain, palpitations, orthopnea, leg swelling and PND. Skin: Negative for rash. Neurological: Negative for dizziness and loss of consciousness. Physical Exam:      General: Well developed, in no acute distress, cooperative and alert  Heart:  reg rate and rhythm; normal S1/S2; no murmurs, no gallops or rubs. Respiratory: Clear bilaterally x 4, no wheezing or rales  Extremities:  Normal cap refill, no cyanosis, atraumatic. No edema.   Vascular: 2+ pulses symmetric in all extremities    Vitals:    10/28/21 0958   BP: (!) 140/70   BP 1 Location: Right upper arm   BP Patient Position: Sitting   BP Cuff Size: Adult   Pulse: 60   Height: 5' 4\" (1.626 m)   Weight: 169 lb 4.8 oz (76.8 kg)       Lab Results   Component Value Date/Time Cholesterol, total 143 10/22/2020 10:19 AM    HDL Cholesterol 66 10/22/2020 10:19 AM    LDL, calculated 63 10/22/2020 10:19 AM    LDL, calculated 50 10/17/2019 10:18 AM    VLDL, calculated 14 10/22/2020 10:19 AM    VLDL, calculated 11 10/17/2019 10:18 AM    Triglyceride 68 10/22/2020 10:19 AM    CHOL/HDL Ratio 2.4 08/27/2010 01:01 PM     Lab Results   Component Value Date/Time    WBC 5.6 10/17/2017 08:42 AM    HGB 13.5 10/17/2017 08:42 AM    HCT 38.3 10/17/2017 08:42 AM    PLATELET 938 89/61/0888 08:42 AM    MCV 87 10/17/2017 08:42 AM     Lab Results   Component Value Date/Time    Sodium 137 10/22/2020 10:19 AM    Potassium 4.6 10/22/2020 10:19 AM    Chloride 101 10/22/2020 10:19 AM    CO2 24 10/22/2020 10:19 AM    Anion gap 9 02/09/2013 01:47 AM    Glucose 107 (H) 10/22/2020 10:19 AM    BUN 11 10/22/2020 10:19 AM    Creatinine 1.01 (H) 10/22/2020 10:19 AM    BUN/Creatinine ratio 11 (L) 10/22/2020 10:19 AM    GFR est AA 57 (L) 10/22/2020 10:19 AM    GFR est non-AA 49 (L) 10/22/2020 10:19 AM    Calcium 9.6 10/22/2020 10:19 AM    Bilirubin, total 0.5 10/22/2020 10:19 AM    Alk. phosphatase 73 10/22/2020 10:19 AM    Protein, total 7.0 10/22/2020 10:19 AM    Albumin 4.5 10/22/2020 10:19 AM    Globulin 3.1 11/09/2010 10:31 AM    A-G Ratio 1.8 10/22/2020 10:19 AM    ALT (SGPT) 15 10/22/2020 10:19 AM    AST (SGOT) 16 10/22/2020 10:19 AM       ECG done today sinus rhythm; 1st degree AV block, RBBB     Assessment:       ICD-10-CM ICD-9-CM    1. Paroxysmal SVT (supraventricular tachycardia) ([facility])  I47.1 427.0    2. Atherosclerosis of native coronary artery of native heart without angina pectoris  I25.10 414.01 AMB POC EKG ROUTINE W/ 12 LEADS, INTER & REP   3. Essential hypertension  I10 401.9 AMB POC EKG ROUTINE W/ 12 LEADS, INTER & REP   4. Mixed hyperlipidemia  E78.2 272.2 AMB POC EKG ROUTINE W/ 12 LEADS, INTER & REP      LIPID PANEL      METABOLIC PANEL, COMPREHENSIVE   5.  Type 2 diabetes with nephropathy (HCC)  E11.21 250.40      583.81    6. Postsurgical percutaneous transluminal coronary angioplasty status  Z98.61 V45.82         Plan:     1. Atherosclerosis of native coronary artery of native heart without angina pectoris  Hx of bifurcating stents to the LAD  Lexiscan stress test done 9/2013 without evidence of ischemia  Without anginal or anginal equivalent symptoms  Continue Toprol, statin. Recommend to continue Plavix and ASA daily. She is hesitant to resume Plavix, will consider increasing ASA daily. Discussed importance given hx of bifurcating LAD stents.     2. Paroxysmal SVT (supraventricular tachycardia) (HCC)  No recurrence  Continue BB therapy     3. Essential hypertension  BP controlled. Continue anti-hypertensive therapy and low sodium diet     4. Mixed hyperlipidemia  LDL 63 in 10/2020  Repeat lipids for this year  Continue statin therapy    F/u with Dr. Shona Lanza in 1 year    Patient seen and examined by me with the above nurse practitioner. I personally performed all components of the history, physical, and medical decision making and agree with the assessment and plan with minor modifications as noted. Today the patient presents with stable cardiac status, without any chest pain or shortness of breath. Taking Plavix daily, but not taking aspirin every day. General PE  Gen:  NAD  Mental Status - Alert. General Appearance - Not in acute distress. HEENT:  PERRL, no carotid bruits or JVD  Chest and Lung Exam   Inspection: Accessory muscles - No use of accessory muscles in breathing. Auscultation:   Breath sounds: - Normal.   Cardiovascular   Inspection: Jugular vein - Bilateral - Inspection Normal.   Palpation/Percussion:   Apical Impulse: - Normal.   Auscultation: Rhythm - Regular. Heart Sounds - S1 WNL and S2 WNL. No S3 or S4. Murmurs & Other Heart Sounds: Auscultation of the heart reveals - No Murmurs.    Peripheral Vascular   Upper Extremity: Inspection - Bilateral - No Cyanotic nailbeds or Digital clubbing. Lower Extremity:   Palpation: Edema - Bilateral - No edema. Abdomen:   Soft, non-tender, bowel sounds are active. Neuro: A&O times 3, CN and motor grossly WNL    Advised to increase aspirin 81 mg to daily, continue Plavix with bifurcating stents in the past as long she is tolerating it without anemia, or signs of bleeding. Counseled on healthy diet and exercise. She admits she has room for improvement in her exercise. Follow up in 1 year, sooner as needed.

## 2021-10-29 ENCOUNTER — TELEPHONE (OUTPATIENT)
Dept: CARDIOLOGY CLINIC | Age: 86
End: 2021-10-29

## 2021-10-29 LAB
ALBUMIN SERPL-MCNC: 4.4 G/DL (ref 3.5–4.6)
ALBUMIN/GLOB SERPL: 1.6 {RATIO} (ref 1.2–2.2)
ALP SERPL-CCNC: 78 IU/L (ref 44–121)
ALT SERPL-CCNC: 21 IU/L (ref 0–32)
AST SERPL-CCNC: 18 IU/L (ref 0–40)
BILIRUB SERPL-MCNC: 0.7 MG/DL (ref 0–1.2)
BUN SERPL-MCNC: 15 MG/DL (ref 10–36)
BUN/CREAT SERPL: 14 (ref 12–28)
CALCIUM SERPL-MCNC: 9.8 MG/DL (ref 8.7–10.3)
CHLORIDE SERPL-SCNC: 100 MMOL/L (ref 96–106)
CHOLEST SERPL-MCNC: 160 MG/DL (ref 100–199)
CO2 SERPL-SCNC: 24 MMOL/L (ref 20–29)
CREAT SERPL-MCNC: 1.05 MG/DL (ref 0.57–1)
GLOBULIN SER CALC-MCNC: 2.8 G/DL (ref 1.5–4.5)
GLUCOSE SERPL-MCNC: 104 MG/DL (ref 65–99)
HDLC SERPL-MCNC: 75 MG/DL
IMP & REVIEW OF LAB RESULTS: NORMAL
INTERPRETATION: NORMAL
LDLC SERPL CALC-MCNC: 73 MG/DL (ref 0–99)
POTASSIUM SERPL-SCNC: 4.3 MMOL/L (ref 3.5–5.2)
PROT SERPL-MCNC: 7.2 G/DL (ref 6–8.5)
SODIUM SERPL-SCNC: 137 MMOL/L (ref 134–144)
TRIGL SERPL-MCNC: 60 MG/DL (ref 0–149)
VLDLC SERPL CALC-MCNC: 12 MG/DL (ref 5–40)

## 2021-10-29 NOTE — TELEPHONE ENCOUNTER
----- Message from Mehrdad Thomas NP sent at 10/29/2021  1:16 PM EDT -----  Please call the patient and inform that lipids remain at goal.  Continue all meds, no changes.     Thanks,  [employer]

## 2021-10-29 NOTE — PROGRESS NOTES
Please call the patient and inform that lipids remain at goal.  Continue all meds, no changes.     Thanks,  [employer]

## 2022-03-19 PROBLEM — E11.21 TYPE 2 DIABETES WITH NEPHROPATHY (HCC): Status: ACTIVE | Noted: 2018-10-11

## 2022-11-14 ENCOUNTER — TELEPHONE (OUTPATIENT)
Dept: CARDIOLOGY CLINIC | Age: 87
End: 2022-11-14

## 2022-11-14 ENCOUNTER — OFFICE VISIT (OUTPATIENT)
Dept: CARDIOLOGY CLINIC | Age: 87
End: 2022-11-14
Payer: MEDICARE

## 2022-11-14 VITALS
RESPIRATION RATE: 18 BRPM | WEIGHT: 164 LBS | OXYGEN SATURATION: 96 % | HEART RATE: 66 BPM | HEIGHT: 64 IN | SYSTOLIC BLOOD PRESSURE: 122 MMHG | BODY MASS INDEX: 28 KG/M2 | DIASTOLIC BLOOD PRESSURE: 80 MMHG

## 2022-11-14 DIAGNOSIS — E11.21 TYPE 2 DIABETES WITH NEPHROPATHY (HCC): ICD-10-CM

## 2022-11-14 DIAGNOSIS — Z98.61 POSTSURGICAL PERCUTANEOUS TRANSLUMINAL CORONARY ANGIOPLASTY STATUS: ICD-10-CM

## 2022-11-14 DIAGNOSIS — I47.1 PAROXYSMAL SVT (SUPRAVENTRICULAR TACHYCARDIA) (HCC): ICD-10-CM

## 2022-11-14 DIAGNOSIS — I25.10 ATHEROSCLEROSIS OF NATIVE CORONARY ARTERY OF NATIVE HEART WITHOUT ANGINA PECTORIS: Primary | ICD-10-CM

## 2022-11-14 DIAGNOSIS — I10 ESSENTIAL HYPERTENSION: ICD-10-CM

## 2022-11-14 DIAGNOSIS — E78.2 MIXED HYPERLIPIDEMIA: ICD-10-CM

## 2022-11-14 PROCEDURE — G8427 DOCREV CUR MEDS BY ELIG CLIN: HCPCS | Performed by: INTERNAL MEDICINE

## 2022-11-14 PROCEDURE — 1101F PT FALLS ASSESS-DOCD LE1/YR: CPT | Performed by: INTERNAL MEDICINE

## 2022-11-14 PROCEDURE — G0463 HOSPITAL OUTPT CLINIC VISIT: HCPCS | Performed by: INTERNAL MEDICINE

## 2022-11-14 PROCEDURE — G8510 SCR DEP NEG, NO PLAN REQD: HCPCS | Performed by: INTERNAL MEDICINE

## 2022-11-14 PROCEDURE — 1123F ACP DISCUSS/DSCN MKR DOCD: CPT | Performed by: INTERNAL MEDICINE

## 2022-11-14 PROCEDURE — 93005 ELECTROCARDIOGRAM TRACING: CPT | Performed by: INTERNAL MEDICINE

## 2022-11-14 PROCEDURE — 1090F PRES/ABSN URINE INCON ASSESS: CPT | Performed by: INTERNAL MEDICINE

## 2022-11-14 PROCEDURE — G8536 NO DOC ELDER MAL SCRN: HCPCS | Performed by: INTERNAL MEDICINE

## 2022-11-14 PROCEDURE — 99214 OFFICE O/P EST MOD 30 MIN: CPT | Performed by: INTERNAL MEDICINE

## 2022-11-14 PROCEDURE — 93010 ELECTROCARDIOGRAM REPORT: CPT | Performed by: INTERNAL MEDICINE

## 2022-11-14 PROCEDURE — G8417 CALC BMI ABV UP PARAM F/U: HCPCS | Performed by: INTERNAL MEDICINE

## 2022-11-14 RX ORDER — METOPROLOL SUCCINATE 25 MG/1
25 TABLET, EXTENDED RELEASE ORAL DAILY
Qty: 90 TABLET | Refills: 4 | Status: SHIPPED | OUTPATIENT
Start: 2022-11-14

## 2022-11-14 NOTE — PROGRESS NOTES
[facility] , [place], [address] [place] [address]  [phone]     Subjective:      Juana Newell is a 80 y.o. female is here for routine f/u. The patient was last seen by us December 2021. Yesterday, she was lifting a heavy cooler with strength and icing it down but now and she fell over. No loss of consciousness and did not hit her head. Today, the patient denies chest pain/ shortness of breath, orthopnea, PND, LE edema, palpitations, syncope, or presyncope.        Patient Active Problem List    Diagnosis Date Noted    Type 2 diabetes with nephropathy ([facility] 10/11/2018    Encounter for medication monitoring 11/08/2016    Essential hypertension 06/02/2015    Paroxysmal SVT (supraventricular tachycardia) ([facility] 06/20/2012    Coronary atherosclerosis of native coronary artery 04/25/2012    Obesity, unspecified 04/25/2012    Premature beats, unspecified 04/25/2012    Mixed hyperlipidemia 04/25/2012    Postsurgical percutaneous transluminal coronary angioplasty status 04/25/2012    Hypovitaminosis D 02/12/2012    LFT'S ABNORMAL 05/25/2010    Chronic low back pain 02/23/2010    Gout 02/23/2010      Sadaf Valdes MD  Past Medical History:   Diagnosis Date    Atrial fibrillation [facility])     CAD (coronary artery disease) 2/23/2010    Cataract     Chest pain, unspecified     Chronic low back pain 2/23/2010    DM (diabetes mellitus) ([facility] 2/23/2010    Gout 2/23/2010    HTN (hypertension) 2/23/2010    Hyperlipidemia 2/23/2010    Long term current use of anticoagulant therapy     ASA 2 times weekly    Other acute and subacute form of ischemic heart disease     Palpitations     Postsurgical percutaneous transluminal coronary angioplasty status 4/25/2012    Tachycardia, unspecified       Past Surgical History:   Procedure Laterality Date    HX CATARACT REMOVAL  july 2010    HX CORONARY STENT PLACEMENT      HX HEART CATHETERIZATION      HX PTCA      AR COLONOSCOPY FLX DX W/COLLJ SPEC WHEN PFRMD  09/2005    repeat in 10 yrs Dr. Supriya Banks     Allergies   Allergen Reactions    Metformin Hives and Swelling     Pt taking Metformin- pt states doesn't think she is allergic    Atenolol Diarrhea    Lisinopril Swelling     Lip swelling    Lozol [Indapamide] Unknown (comments)    Skelaxin [Metaxalone] Unknown (comments)     Pt does not recall      Family History   Problem Relation Age of Onset    Cancer Mother         breast    Breast Cancer Mother     No Known Problems Sister     No Known Problems Brother     Gout Brother     Gout Brother     No Known Problems Brother     No Known Problems Brother     No Known Problems Brother     No Known Problems Sister       Social History     Socioeconomic History    Marital status:      Spouse name: Not on file    Number of children: Not on file    Years of education: Not on file    Highest education level: Not on file   Occupational History    Not on file   Tobacco Use    Smoking status: Former     Packs/day: 1.00     Years: 30.00     Pack years: 30.00     Types: Cigarettes     Quit date: 1984     Years since quittin.8    Smokeless tobacco: Never   Substance and Sexual Activity    Alcohol use: Yes     Alcohol/week: 1.0 standard drink     Types: 1 Shots of liquor per week     Comment: rare    Drug use: No    Sexual activity: Not Currently     Partners: Male   Other Topics Concern    Not on file   Social History Narrative    Not on file     Social Determinants of Health     Financial Resource Strain: Not on file   Food Insecurity: Not on file   Transportation Needs: Not on file   Physical Activity: Not on file   Stress: Not on file   Social Connections: Not on file   Intimate Partner Violence: Not on file   Housing Stability: Not on file      Current Outpatient Medications   Medication Sig    losartan-hydroCHLOROthiazide (HYZAAR) 50-12.5 mg per tablet Take 1 Tablet by mouth daily. metoprolol succinate (TOPROL-XL) 50 mg XL tablet Take 50 mg by mouth daily.     clopidogrel (PLAVIX) 75 mg tab TAKE 1 TABLET BY MOUTH EVERY DAY    allopurinol (ZYLOPRIM) 300 mg tablet TAKE 1 TABLET BY MOUTH ONCE DAILY    cholecalciferol, vitamin D3, 2,000 unit tab Take  by mouth daily. atorvastatin (LIPITOR) 40 mg tablet TAKE 1 TABLET BY MOUTH DAILY    aspirin delayed-release 81 mg tablet Take 1 tablet by mouth daily. (Patient taking differently: Take 81 mg by mouth. Indications: TWICE WEEKLY)    naproxen sodium 220 mg cap Take 2 Caps by mouth as needed. No current facility-administered medications for this visit. Review of Symptoms:  11 systems reviewed, negative other than as stated in the HPI    Physical ExamPhysical Exam:    Vitals:    11/14/22 1505   BP: 122/80   Pulse: 66   Resp: 18   SpO2: 96%   Weight: 164 lb (74.4 kg)   Height: 5' 4\" (1.626 m)     Body mass index is 28.15 kg/m². General PE  Gen:  NAD  Mental Status - Alert. General Appearance - Not in acute distress. HEENT:  PERRL, no carotid bruits or JVD  Chest and Lung Exam   Inspection: Accessory muscles - No use of accessory muscles in breathing. Auscultation:   Breath sounds: - Normal.   Cardiovascular   Inspection: Jugular vein - Bilateral - Inspection Normal.   Palpation/Percussion:   Apical Impulse: - Normal.   Auscultation: Rhythm - Regular. Heart Sounds - S1 WNL and S2 WNL. No S3 or S4. Murmurs & Other Heart Sounds: Auscultation of the heart reveals - No Murmurs. Peripheral Vascular   Upper Extremity: Inspection - Bilateral - No Cyanotic nailbeds or Digital clubbing. Lower Extremity:   Palpation: Edema - Bilateral - No edema. Abdomen:   Soft, non-tender, bowel sounds are active.   Neuro: A&O times 3, CN and motor grossly WNL    Labs:   Lab Results   Component Value Date/Time    Cholesterol, total 160 10/28/2021 11:08 AM    Cholesterol, total 143 10/22/2020 10:19 AM    Cholesterol, total 135 10/17/2019 10:18 AM    Cholesterol, total 147 10/17/2017 08:42 AM    Cholesterol, total 139 05/16/2017 09:15 AM    HDL Cholesterol 75 10/28/2021 11:08 AM    HDL Cholesterol 66 10/22/2020 10:19 AM    HDL Cholesterol 74 10/17/2019 10:18 AM    HDL Cholesterol 54 10/17/2017 08:42 AM    HDL Cholesterol 53 05/16/2017 09:15 AM    LDL, calculated 73 10/28/2021 11:08 AM    LDL, calculated 63 10/22/2020 10:19 AM    LDL, calculated 50 10/17/2019 10:18 AM    LDL, calculated 76 10/17/2017 08:42 AM    LDL, calculated 68 05/16/2017 09:15 AM    LDL, calculated 58 11/08/2016 08:57 AM    LDL, calculated 68 06/07/2016 08:54 AM    Triglyceride 60 10/28/2021 11:08 AM    Triglyceride 68 10/22/2020 10:19 AM    Triglyceride 53 10/17/2019 10:18 AM    Triglyceride 85 10/17/2017 08:42 AM    Triglyceride 89 05/16/2017 09:15 AM    CHOL/HDL Ratio 2.4 08/27/2010 01:01 PM    CHOL/HDL Ratio 2.4 03/26/2010 12:18 PM    CHOL/HDL Ratio 3.1 11/06/2009 12:15 PM    CHOL/HDL Ratio 2.9 07/08/2009 01:18 PM    CHOL/HDL Ratio 2.5 04/08/2009 12:17 PM     Lab Results   Component Value Date/Time     02/09/2013 01:47 AM     Lab Results   Component Value Date/Time    Sodium 137 10/28/2021 11:08 AM    Potassium 4.3 10/28/2021 11:08 AM    Chloride 100 10/28/2021 11:08 AM    CO2 24 10/28/2021 11:08 AM    Anion gap 9 02/09/2013 01:47 AM    Glucose 104 (H) 10/28/2021 11:08 AM    BUN 15 10/28/2021 11:08 AM    Creatinine 1.05 (H) 10/28/2021 11:08 AM    BUN/Creatinine ratio 14 10/28/2021 11:08 AM    GFR est AA 54 (L) 10/28/2021 11:08 AM    GFR est non-AA 47 (L) 10/28/2021 11:08 AM    Calcium 9.8 10/28/2021 11:08 AM    Bilirubin, total 0.7 10/28/2021 11:08 AM    Alk. phosphatase 78 10/28/2021 11:08 AM    Protein, total 7.2 10/28/2021 11:08 AM    Albumin 4.4 10/28/2021 11:08 AM    Globulin 3.1 11/09/2010 10:31 AM    A-G Ratio 1.6 10/28/2021 11:08 AM    ALT (SGPT) 21 10/28/2021 11:08 AM       EKG:  NSR, first-degree AV block, IVCD       Assessment:          ICD-10-CM ICD-9-CM    1.  Atherosclerosis of native coronary artery of native heart without angina pectoris  I25.10 414.01 AMB POC EKG ROUTINE W/ 12 LEADS, INTER & REP      2. Paroxysmal SVT (supraventricular tachycardia) (HCC)  I47.1 427.0 AMB POC EKG ROUTINE W/ 12 LEADS, INTER & REP      3. Mixed hyperlipidemia  E78.2 272.2       4. Postsurgical percutaneous transluminal coronary angioplasty status  Z98.61 V45.82       5. Essential hypertension  I10 401.9       6. Type 2 diabetes with nephropathy (HCC)  E11.21 250.40      583.81           Orders Placed This Encounter    AMB POC EKG ROUTINE W/ 12 LEADS, INTER & REP     Order Specific Question:   Reason for Exam:     Answer:   routine        Plan:     1. Atherosclerosis of native coronary artery of native heart without angina pectoris  Hx of bifurcating stents to the LAD  Lexiscan stress test done 9/2013 without evidence of ischemia  Without anginal or anginal equivalent symptoms  Continue statin. Will decrease Toprol from 50 mg to 25 mg daily now due to heart rate of 53 and some fatigue blood test done by her primary care doctor lately okay but negative high like within the last year which is last February we will get the most recent labs from him just sounds. Recommend to continue Plavix and ASA daily. She is hesitant to resume Plavix, will consider increasing ASA daily. Discussed importance given hx of bifurcating LAD stents. 2. Paroxysmal SVT (supraventricular tachycardia) (HCC)  No recurrence  Continue BB therapy     3. Essential hypertension  BP controlled. Continue anti-hypertensive therapy and low sodium diet     4. Mixed hyperlipidemia  LDL 63 in 10/2020  Lab Results   Component Value Date/Time    Cholesterol, total 160 10/28/2021 11:08 AM    HDL Cholesterol 75 10/28/2021 11:08 AM    LDL, calculated 73 10/28/2021 11:08 AM    LDL, calculated 50 10/17/2019 10:18 AM    VLDL, calculated 12 10/28/2021 11:08 AM    VLDL, calculated 11 10/17/2019 10:18 AM    Triglyceride 60 10/28/2021 11:08 AM    CHOL/HDL Ratio 2.4 08/27/2010 01:01 PM   Will request most recent labs from PCP.   Continue statin therapy    Counseled on diet and exercise- eventual goal of 30-60 minutes 5-7 times a week as per AHA guidelines. Follow up in 1 year, sooner as needed.    Ashutosh Mirza MD

## 2022-11-14 NOTE — LETTER
11/14/2022    Patient: Amanuel Lee   YOB: 1931   Date of Visit: 11/14/2022     Johan Robles MD  [address] [facility] [address]  Via Fax: [phone]    Dear Johan Robles MD,      Thank you for referring Ms. Ayaka Vallejo to [facility] for evaluation. My notes for this consultation are attached. If you have questions, please do not hesitate to call me. I look forward to following your patient along with you.       Sincerely,    Nevaeh Manjarrez MD

## 2022-11-15 NOTE — TELEPHONE ENCOUNTER
Latest blood work requested to the office of Dr. Ashutosh Martinez at [phone]. Results will be fax today  stated.

## 2023-11-10 RX ORDER — METOPROLOL SUCCINATE 25 MG/1
25 TABLET, EXTENDED RELEASE ORAL DAILY
Qty: 90 TABLET | Refills: 0 | Status: SHIPPED | OUTPATIENT
Start: 2023-11-10

## 2023-11-14 ENCOUNTER — OFFICE VISIT (OUTPATIENT)
Age: 88
End: 2023-11-14
Payer: MEDICARE

## 2023-11-14 VITALS
HEIGHT: 64 IN | WEIGHT: 165 LBS | HEART RATE: 63 BPM | OXYGEN SATURATION: 97 % | DIASTOLIC BLOOD PRESSURE: 100 MMHG | SYSTOLIC BLOOD PRESSURE: 136 MMHG | BODY MASS INDEX: 28.17 KG/M2 | RESPIRATION RATE: 18 BRPM

## 2023-11-14 DIAGNOSIS — E11.21 TYPE 2 DIABETES MELLITUS WITH DIABETIC NEPHROPATHY, UNSPECIFIED WHETHER LONG TERM INSULIN USE (HCC): ICD-10-CM

## 2023-11-14 DIAGNOSIS — I25.10 ATHEROSCLEROSIS OF NATIVE CORONARY ARTERY OF NATIVE HEART WITHOUT ANGINA PECTORIS: Primary | ICD-10-CM

## 2023-11-14 DIAGNOSIS — I47.10 SUPRAVENTRICULAR TACHYCARDIA: ICD-10-CM

## 2023-11-14 DIAGNOSIS — E78.2 MIXED HYPERLIPIDEMIA: ICD-10-CM

## 2023-11-14 DIAGNOSIS — I10 ESSENTIAL (PRIMARY) HYPERTENSION: ICD-10-CM

## 2023-11-14 PROCEDURE — 99214 OFFICE O/P EST MOD 30 MIN: CPT | Performed by: INTERNAL MEDICINE

## 2023-11-14 PROCEDURE — 93005 ELECTROCARDIOGRAM TRACING: CPT | Performed by: INTERNAL MEDICINE

## 2023-11-14 PROCEDURE — G8427 DOCREV CUR MEDS BY ELIG CLIN: HCPCS | Performed by: INTERNAL MEDICINE

## 2023-11-14 PROCEDURE — G8484 FLU IMMUNIZE NO ADMIN: HCPCS | Performed by: INTERNAL MEDICINE

## 2023-11-14 PROCEDURE — 93010 ELECTROCARDIOGRAM REPORT: CPT | Performed by: INTERNAL MEDICINE

## 2023-11-14 PROCEDURE — 1090F PRES/ABSN URINE INCON ASSESS: CPT | Performed by: INTERNAL MEDICINE

## 2023-11-14 PROCEDURE — 1123F ACP DISCUSS/DSCN MKR DOCD: CPT | Performed by: INTERNAL MEDICINE

## 2023-11-14 PROCEDURE — G8419 CALC BMI OUT NRM PARAM NOF/U: HCPCS | Performed by: INTERNAL MEDICINE

## 2023-11-14 PROCEDURE — 1036F TOBACCO NON-USER: CPT | Performed by: INTERNAL MEDICINE

## 2023-11-14 ASSESSMENT — PATIENT HEALTH QUESTIONNAIRE - PHQ9
2. FEELING DOWN, DEPRESSED OR HOPELESS: 0
SUM OF ALL RESPONSES TO PHQ QUESTIONS 1-9: 0
SUM OF ALL RESPONSES TO PHQ QUESTIONS 1-9: 0
1. LITTLE INTEREST OR PLEASURE IN DOING THINGS: 0
SUM OF ALL RESPONSES TO PHQ QUESTIONS 1-9: 0
SUM OF ALL RESPONSES TO PHQ9 QUESTIONS 1 & 2: 0
SUM OF ALL RESPONSES TO PHQ QUESTIONS 1-9: 0

## 2024-01-11 RX ORDER — METOPROLOL SUCCINATE 25 MG/1
25 TABLET, EXTENDED RELEASE ORAL DAILY
Qty: 90 TABLET | Refills: 3 | Status: SHIPPED | OUTPATIENT
Start: 2024-01-11

## 2024-01-11 NOTE — TELEPHONE ENCOUNTER
Per VO of MD    LOV: 11/14/2023     Future Appointments   Date Time Provider Department Center   11/18/2024  8:20 AM Jordan Rahman MD [facility] AMB       Requested Prescriptions     Signed Prescriptions Disp Refills    metoprolol succinate (TOPROL XL) 25 MG extended release tablet 90 tablet 3     Sig: TAKE 1 TABLET BY MOUTH DAILY     Authorizing Provider: JORDAN RAHMAN     Ordering User: LARISA LONGO

## 2024-10-17 ENCOUNTER — TELEPHONE (OUTPATIENT)
Age: 88
End: 2024-10-17

## 2024-10-17 NOTE — TELEPHONE ENCOUNTER
Called patient to inform of appointment cancellation on 11/18/24 with Dr. Rahman, no voicemail set up.      [place]

## 2024-11-18 ENCOUNTER — TELEPHONE (OUTPATIENT)
Age: 89
End: 2024-11-18

## 2024-11-18 NOTE — TELEPHONE ENCOUNTER
Patient's daughter Jillian is calling to let the nurse know that her mother is taking Clopidogrel 75 mg.Patient's daughter wanted to update the nurse on her mother medication.    [phone] Jillian (daughter)

## 2024-11-19 NOTE — TELEPHONE ENCOUNTER
Spoke with patient's daughter who stated that ms herman is taking Plavix 75 mg daily. Message sent to Md. Medication refill by PCP, stated.

## 2024-11-27 RX ORDER — METOPROLOL SUCCINATE 25 MG/1
25 TABLET, EXTENDED RELEASE ORAL DAILY
Qty: 90 TABLET | Refills: 4 | Status: SHIPPED | OUTPATIENT
Start: 2024-11-27

## 2024-11-27 NOTE — TELEPHONE ENCOUNTER
Refill Request Received for the Following Medication     Requested Prescriptions     Pending Prescriptions Disp Refills    metoprolol succinate (TOPROL XL) 25 MG extended release tablet [Pharmacy Med Name: Metoprolol Succinate ER 25 MG Oral Tablet Extended Release 24 Hour] 90 tablet 3     Sig: TAKE 1 TABLET BY MOUTH DAILY       Last Prescribed: 01-    Last Appointment With Me:  11/18/2024     Future Appointments:  Future Appointments   Date Time Provider Department Center   12/12/2025  8:20 AM Kj Rahman MD [facility]